# Patient Record
Sex: MALE | Race: OTHER | NOT HISPANIC OR LATINO | URBAN - METROPOLITAN AREA
[De-identification: names, ages, dates, MRNs, and addresses within clinical notes are randomized per-mention and may not be internally consistent; named-entity substitution may affect disease eponyms.]

---

## 2017-08-05 ENCOUNTER — INPATIENT (INPATIENT)
Facility: HOSPITAL | Age: 72
LOS: 3 days | Discharge: HOME | DRG: 683 | End: 2017-08-09
Attending: FAMILY MEDICINE | Admitting: EMERGENCY MEDICINE
Payer: MEDICAID

## 2017-08-05 VITALS
TEMPERATURE: 98 F | HEART RATE: 74 BPM | DIASTOLIC BLOOD PRESSURE: 67 MMHG | HEIGHT: 66 IN | SYSTOLIC BLOOD PRESSURE: 157 MMHG | RESPIRATION RATE: 16 BRPM | WEIGHT: 190.04 LBS | OXYGEN SATURATION: 99 %

## 2017-08-05 DIAGNOSIS — N17.9 ACUTE KIDNEY FAILURE, UNSPECIFIED: ICD-10-CM

## 2017-08-05 LAB
ALBUMIN SERPL ELPH-MCNC: 4.4 G/DL — SIGNIFICANT CHANGE UP (ref 3.3–5.2)
ALP SERPL-CCNC: 112 U/L — SIGNIFICANT CHANGE UP (ref 40–120)
ALT FLD-CCNC: 14 U/L — SIGNIFICANT CHANGE UP
ANION GAP SERPL CALC-SCNC: 16 MMOL/L — SIGNIFICANT CHANGE UP (ref 5–17)
APTT BLD: 29.8 SEC — SIGNIFICANT CHANGE UP (ref 27.5–37.4)
AST SERPL-CCNC: 15 U/L — SIGNIFICANT CHANGE UP
BASOPHILS # BLD AUTO: 0 K/UL — SIGNIFICANT CHANGE UP (ref 0–0.2)
BASOPHILS NFR BLD AUTO: 0.3 % — SIGNIFICANT CHANGE UP (ref 0–2)
BILIRUB SERPL-MCNC: 0.3 MG/DL — LOW (ref 0.4–2)
BUN SERPL-MCNC: 72 MG/DL — HIGH (ref 8–20)
CALCIUM SERPL-MCNC: 9.1 MG/DL — SIGNIFICANT CHANGE UP (ref 8.6–10.2)
CHLORIDE SERPL-SCNC: 104 MMOL/L — SIGNIFICANT CHANGE UP (ref 98–107)
CO2 SERPL-SCNC: 19 MMOL/L — LOW (ref 22–29)
CREAT SERPL-MCNC: 3.75 MG/DL — HIGH (ref 0.5–1.3)
EOSINOPHIL # BLD AUTO: 0.2 K/UL — SIGNIFICANT CHANGE UP (ref 0–0.5)
EOSINOPHIL NFR BLD AUTO: 2.8 % — SIGNIFICANT CHANGE UP (ref 0–5)
GLUCOSE SERPL-MCNC: 326 MG/DL — HIGH (ref 70–115)
HCT VFR BLD CALC: 25.8 % — LOW (ref 42–52)
HGB BLD-MCNC: 8.3 G/DL — LOW (ref 14–18)
INR BLD: 0.97 RATIO — SIGNIFICANT CHANGE UP (ref 0.88–1.16)
LYMPHOCYTES # BLD AUTO: 1.4 K/UL — SIGNIFICANT CHANGE UP (ref 1–4.8)
LYMPHOCYTES # BLD AUTO: 22.1 % — SIGNIFICANT CHANGE UP (ref 20–55)
MCHC RBC-ENTMCNC: 25.8 PG — LOW (ref 27–31)
MCHC RBC-ENTMCNC: 32.2 G/DL — SIGNIFICANT CHANGE UP (ref 32–36)
MCV RBC AUTO: 80.1 FL — SIGNIFICANT CHANGE UP (ref 80–94)
MONOCYTES # BLD AUTO: 0.5 K/UL — SIGNIFICANT CHANGE UP (ref 0–0.8)
MONOCYTES NFR BLD AUTO: 8 % — SIGNIFICANT CHANGE UP (ref 3–10)
NEUTROPHILS # BLD AUTO: 4.3 K/UL — SIGNIFICANT CHANGE UP (ref 1.8–8)
NEUTROPHILS NFR BLD AUTO: 66.6 % — SIGNIFICANT CHANGE UP (ref 37–73)
NT-PROBNP SERPL-SCNC: 913 PG/ML — HIGH (ref 0–300)
PLATELET # BLD AUTO: 184 K/UL — SIGNIFICANT CHANGE UP (ref 150–400)
POTASSIUM SERPL-MCNC: 5.5 MMOL/L — HIGH (ref 3.5–5.3)
POTASSIUM SERPL-SCNC: 5.5 MMOL/L — HIGH (ref 3.5–5.3)
PROT SERPL-MCNC: 7.6 G/DL — SIGNIFICANT CHANGE UP (ref 6.6–8.7)
PROTHROM AB SERPL-ACNC: 10.7 SEC — SIGNIFICANT CHANGE UP (ref 9.8–12.7)
RBC # BLD: 3.22 M/UL — LOW (ref 4.6–6.2)
RBC # FLD: 13.9 % — SIGNIFICANT CHANGE UP (ref 11–15.6)
SODIUM SERPL-SCNC: 139 MMOL/L — SIGNIFICANT CHANGE UP (ref 135–145)
TROPONIN T SERPL-MCNC: 0.02 NG/ML — SIGNIFICANT CHANGE UP (ref 0–0.06)
WBC # BLD: 6.5 K/UL — SIGNIFICANT CHANGE UP (ref 4.8–10.8)
WBC # FLD AUTO: 6.5 K/UL — SIGNIFICANT CHANGE UP (ref 4.8–10.8)

## 2017-08-05 PROCEDURE — 76775 US EXAM ABDO BACK WALL LIM: CPT | Mod: 26

## 2017-08-05 PROCEDURE — 71010: CPT | Mod: 26

## 2017-08-05 PROCEDURE — 99285 EMERGENCY DEPT VISIT HI MDM: CPT

## 2017-08-05 PROCEDURE — 93010 ELECTROCARDIOGRAM REPORT: CPT

## 2017-08-05 RX ORDER — SODIUM POLYSTYRENE SULFONATE 4.1 MEQ/G
30 POWDER, FOR SUSPENSION ORAL ONCE
Qty: 0 | Refills: 0 | Status: COMPLETED | OUTPATIENT
Start: 2017-08-05 | End: 2017-08-05

## 2017-08-05 RX ORDER — SODIUM CHLORIDE 9 MG/ML
1000 INJECTION, SOLUTION INTRAVENOUS
Qty: 0 | Refills: 0 | Status: DISCONTINUED | OUTPATIENT
Start: 2017-08-05 | End: 2017-08-08

## 2017-08-05 RX ADMIN — SODIUM CHLORIDE 75 MILLILITER(S): 9 INJECTION, SOLUTION INTRAVENOUS at 22:27

## 2017-08-05 RX ADMIN — SODIUM POLYSTYRENE SULFONATE 30 GRAM(S): 4.1 POWDER, FOR SUSPENSION ORAL at 22:17

## 2017-08-05 NOTE — ED PROVIDER NOTE - OBJECTIVE STATEMENT
72 year old male with PMH DM, htn, hld ckd presenting with itching. Pt states that for several months he has had episodes of tingling and itching. Sx are intermittent, no alleviating/exacerbating factors, and occur diffusely. Pt deneis fever, chills, abd pain, chest pain, SOB, but does endorse pedal edema.

## 2017-08-05 NOTE — ED PROVIDER NOTE - PRINCIPAL DIAGNOSIS
Acute renal failure superimposed on chronic kidney disease, unspecified CKD stage, unspecified acute renal failure type

## 2017-08-05 NOTE — ED PROVIDER NOTE - CARE PLAN
Principal Discharge DX:	Acute renal failure superimposed on chronic kidney disease, unspecified CKD stage, unspecified acute renal failure type

## 2017-08-05 NOTE — ED PROVIDER NOTE - PROGRESS NOTE DETAILS
discussed with neuro given the hyperkalemia and acidosis. He advises kelin, tele, IVF with bicarb bhavana will come to see in am

## 2017-08-05 NOTE — ED ADULT TRIAGE NOTE - CHIEF COMPLAINT QUOTE
pt presents to ED with swelling to b/l  LE/feet x four days. afebrile. as per pt, no redness noted to feet,. denies SOB. breathing si shakira and unlabored.

## 2017-08-05 NOTE — ED STATDOCS - PROGRESS NOTE DETAILS
73 y/o male with PMHx diabetes presents to the ED with c/o lower leg pain, onset 4 days. Pt reports swelling to LE and SOB since yesterday. Protocol orders have been entered following a focused evaluation in intake. Pt to be placed in the main ED for further evaluation by another provider.

## 2017-08-05 NOTE — ED ADULT NURSE NOTE - PMH
Anemia  unspecified iron deficiency  BPH (benign prostatic hyperplasia)    Bradycardia    CKD (chronic kidney disease)    Diabetes  DM type 2  Gout    HTN (hypertension)    Hyperlipemia    Nonspecific ST-T changes

## 2017-08-05 NOTE — ED ADULT NURSE REASSESSMENT NOTE - NS ED NURSE REASSESS COMMENT FT1
Report received from off going RN, charting as noted. Patient A&Ox4, denies any pain or discomfort. Respirations even & unlabored, denies any numbness or tingling. Cardiac monitor in place. IV site C/D/I, patent, negative s/s phlebitis or infiltration. IVF in progress, well tolerated. Daughter at bedside, will continue to monitor.

## 2017-08-06 DIAGNOSIS — E87.5 HYPERKALEMIA: ICD-10-CM

## 2017-08-06 DIAGNOSIS — N18.5 CHRONIC KIDNEY DISEASE, STAGE 5: ICD-10-CM

## 2017-08-06 DIAGNOSIS — E11.22 TYPE 2 DIABETES MELLITUS WITH DIABETIC CHRONIC KIDNEY DISEASE: ICD-10-CM

## 2017-08-06 DIAGNOSIS — H40.9 UNSPECIFIED GLAUCOMA: ICD-10-CM

## 2017-08-06 DIAGNOSIS — N17.9 ACUTE KIDNEY FAILURE, UNSPECIFIED: ICD-10-CM

## 2017-08-06 DIAGNOSIS — N40.0 BENIGN PROSTATIC HYPERPLASIA WITHOUT LOWER URINARY TRACT SYMPTOMS: ICD-10-CM

## 2017-08-06 DIAGNOSIS — E78.5 HYPERLIPIDEMIA, UNSPECIFIED: ICD-10-CM

## 2017-08-06 LAB
ANION GAP SERPL CALC-SCNC: 16 MMOL/L — SIGNIFICANT CHANGE UP (ref 5–17)
APPEARANCE UR: CLEAR — SIGNIFICANT CHANGE UP
BACTERIA # UR AUTO: ABNORMAL
BASOPHILS # BLD AUTO: 0 K/UL — SIGNIFICANT CHANGE UP (ref 0–0.2)
BASOPHILS NFR BLD AUTO: 0.3 % — SIGNIFICANT CHANGE UP (ref 0–2)
BILIRUB UR-MCNC: NEGATIVE — SIGNIFICANT CHANGE UP
BUN SERPL-MCNC: 70 MG/DL — HIGH (ref 8–20)
CALCIUM SERPL-MCNC: 8.7 MG/DL — SIGNIFICANT CHANGE UP (ref 8.6–10.2)
CHLORIDE SERPL-SCNC: 108 MMOL/L — HIGH (ref 98–107)
CO2 SERPL-SCNC: 21 MMOL/L — LOW (ref 22–29)
COLOR SPEC: YELLOW — SIGNIFICANT CHANGE UP
CREAT SERPL-MCNC: 3.6 MG/DL — HIGH (ref 0.5–1.3)
DIFF PNL FLD: ABNORMAL
EOSINOPHIL # BLD AUTO: 0.2 K/UL — SIGNIFICANT CHANGE UP (ref 0–0.5)
EOSINOPHIL NFR BLD AUTO: 3.1 % — SIGNIFICANT CHANGE UP (ref 0–5)
EPI CELLS # UR: SIGNIFICANT CHANGE UP
FERRITIN SERPL-MCNC: 106.2 NG/ML — SIGNIFICANT CHANGE UP (ref 30–400)
GLUCOSE SERPL-MCNC: 187 MG/DL — HIGH (ref 70–115)
GLUCOSE UR QL: NEGATIVE MG/DL — SIGNIFICANT CHANGE UP
HBA1C BLD-MCNC: 7.3 % — HIGH (ref 4–5.6)
HCT VFR BLD CALC: 25 % — LOW (ref 42–52)
HGB BLD-MCNC: 8.1 G/DL — LOW (ref 14–18)
IRON SATN MFR SERPL: 18 % — SIGNIFICANT CHANGE UP (ref 16–55)
IRON SATN MFR SERPL: 49 UG/DL — LOW (ref 59–158)
KETONES UR-MCNC: NEGATIVE — SIGNIFICANT CHANGE UP
LEUKOCYTE ESTERASE UR-ACNC: NEGATIVE — SIGNIFICANT CHANGE UP
LYMPHOCYTES # BLD AUTO: 1.8 K/UL — SIGNIFICANT CHANGE UP (ref 1–4.8)
LYMPHOCYTES # BLD AUTO: 24.4 % — SIGNIFICANT CHANGE UP (ref 20–55)
MAGNESIUM SERPL-MCNC: 2.1 MG/DL — SIGNIFICANT CHANGE UP (ref 1.6–2.6)
MCHC RBC-ENTMCNC: 26 PG — LOW (ref 27–31)
MCHC RBC-ENTMCNC: 32.4 G/DL — SIGNIFICANT CHANGE UP (ref 32–36)
MCV RBC AUTO: 80.1 FL — SIGNIFICANT CHANGE UP (ref 80–94)
MONOCYTES # BLD AUTO: 0.5 K/UL — SIGNIFICANT CHANGE UP (ref 0–0.8)
MONOCYTES NFR BLD AUTO: 6.8 % — SIGNIFICANT CHANGE UP (ref 3–10)
NEUTROPHILS # BLD AUTO: 4.7 K/UL — SIGNIFICANT CHANGE UP (ref 1.8–8)
NEUTROPHILS NFR BLD AUTO: 65.1 % — SIGNIFICANT CHANGE UP (ref 37–73)
NITRITE UR-MCNC: NEGATIVE — SIGNIFICANT CHANGE UP
PH UR: 6.5 — SIGNIFICANT CHANGE UP (ref 5–8)
PHOSPHATE SERPL-MCNC: 4.2 MG/DL — SIGNIFICANT CHANGE UP (ref 2.4–4.7)
PLATELET # BLD AUTO: 173 K/UL — SIGNIFICANT CHANGE UP (ref 150–400)
POTASSIUM SERPL-MCNC: 4.8 MMOL/L — SIGNIFICANT CHANGE UP (ref 3.5–5.3)
POTASSIUM SERPL-SCNC: 4.8 MMOL/L — SIGNIFICANT CHANGE UP (ref 3.5–5.3)
PROT UR-MCNC: 100 MG/DL
RBC # BLD: 3.12 M/UL — LOW (ref 4.6–6.2)
RBC # BLD: 3.12 M/UL — LOW (ref 4.6–6.2)
RBC # FLD: 14.1 % — SIGNIFICANT CHANGE UP (ref 11–15.6)
RBC CASTS # UR COMP ASSIST: SIGNIFICANT CHANGE UP /HPF (ref 0–4)
RETICS #: 3.3 K/UL — LOW (ref 25–125)
RETICS/RBC NFR: 1 % — SIGNIFICANT CHANGE UP (ref 0.5–2.6)
SODIUM SERPL-SCNC: 145 MMOL/L — SIGNIFICANT CHANGE UP (ref 135–145)
SP GR SPEC: 1.01 — SIGNIFICANT CHANGE UP (ref 1.01–1.02)
TIBC SERPL-MCNC: 279 UG/DL — SIGNIFICANT CHANGE UP (ref 220–430)
TRANSFERRIN SERPL-MCNC: 195 MG/DL — SIGNIFICANT CHANGE UP (ref 180–329)
UROBILINOGEN FLD QL: NEGATIVE MG/DL — SIGNIFICANT CHANGE UP
WBC # BLD: 7.2 K/UL — SIGNIFICANT CHANGE UP (ref 4.8–10.8)
WBC # FLD AUTO: 7.2 K/UL — SIGNIFICANT CHANGE UP (ref 4.8–10.8)
WBC UR QL: SIGNIFICANT CHANGE UP

## 2017-08-06 PROCEDURE — 93970 EXTREMITY STUDY: CPT | Mod: 26

## 2017-08-06 PROCEDURE — 12345: CPT | Mod: GC,NC

## 2017-08-06 PROCEDURE — 93880 EXTRACRANIAL BILAT STUDY: CPT | Mod: 26

## 2017-08-06 RX ORDER — SODIUM CHLORIDE 9 MG/ML
1000 INJECTION, SOLUTION INTRAVENOUS
Qty: 0 | Refills: 0 | Status: DISCONTINUED | OUTPATIENT
Start: 2017-08-06 | End: 2017-08-09

## 2017-08-06 RX ORDER — TIMOLOL 0.5 %
1 DROPS OPHTHALMIC (EYE)
Qty: 0 | Refills: 0 | COMMUNITY

## 2017-08-06 RX ORDER — LISINOPRIL 2.5 MG/1
5 TABLET ORAL DAILY
Qty: 0 | Refills: 0 | Status: DISCONTINUED | OUTPATIENT
Start: 2017-08-06 | End: 2017-08-09

## 2017-08-06 RX ORDER — DEXTROSE 50 % IN WATER 50 %
12.5 SYRINGE (ML) INTRAVENOUS ONCE
Qty: 0 | Refills: 0 | Status: DISCONTINUED | OUTPATIENT
Start: 2017-08-06 | End: 2017-08-09

## 2017-08-06 RX ORDER — IRON SUCROSE 20 MG/ML
200 INJECTION, SOLUTION INTRAVENOUS DAILY
Qty: 0 | Refills: 0 | Status: DISCONTINUED | OUTPATIENT
Start: 2017-08-06 | End: 2017-08-09

## 2017-08-06 RX ORDER — TAMSULOSIN HYDROCHLORIDE 0.4 MG/1
0.4 CAPSULE ORAL AT BEDTIME
Qty: 0 | Refills: 0 | Status: DISCONTINUED | OUTPATIENT
Start: 2017-08-06 | End: 2017-08-09

## 2017-08-06 RX ORDER — GLUCAGON INJECTION, SOLUTION 0.5 MG/.1ML
1 INJECTION, SOLUTION SUBCUTANEOUS ONCE
Qty: 0 | Refills: 0 | Status: DISCONTINUED | OUTPATIENT
Start: 2017-08-06 | End: 2017-08-09

## 2017-08-06 RX ORDER — ONDANSETRON 8 MG/1
4 TABLET, FILM COATED ORAL EVERY 6 HOURS
Qty: 0 | Refills: 0 | Status: DISCONTINUED | OUTPATIENT
Start: 2017-08-06 | End: 2017-08-09

## 2017-08-06 RX ORDER — TIMOLOL 0.5 %
1 DROPS OPHTHALMIC (EYE)
Qty: 0 | Refills: 0 | Status: DISCONTINUED | OUTPATIENT
Start: 2017-08-06 | End: 2017-08-09

## 2017-08-06 RX ORDER — ATORVASTATIN CALCIUM 80 MG/1
20 TABLET, FILM COATED ORAL AT BEDTIME
Qty: 0 | Refills: 0 | Status: DISCONTINUED | OUTPATIENT
Start: 2017-08-06 | End: 2017-08-08

## 2017-08-06 RX ORDER — DEXTROSE 50 % IN WATER 50 %
1 SYRINGE (ML) INTRAVENOUS ONCE
Qty: 0 | Refills: 0 | Status: DISCONTINUED | OUTPATIENT
Start: 2017-08-06 | End: 2017-08-09

## 2017-08-06 RX ORDER — LABETALOL HCL 100 MG
100 TABLET ORAL
Qty: 0 | Refills: 0 | Status: DISCONTINUED | OUTPATIENT
Start: 2017-08-06 | End: 2017-08-09

## 2017-08-06 RX ORDER — HEPARIN SODIUM 5000 [USP'U]/ML
5000 INJECTION INTRAVENOUS; SUBCUTANEOUS EVERY 8 HOURS
Qty: 0 | Refills: 0 | Status: DISCONTINUED | OUTPATIENT
Start: 2017-08-06 | End: 2017-08-09

## 2017-08-06 RX ORDER — ALLOPURINOL 300 MG
100 TABLET ORAL DAILY
Qty: 0 | Refills: 0 | Status: DISCONTINUED | OUTPATIENT
Start: 2017-08-06 | End: 2017-08-09

## 2017-08-06 RX ORDER — SODIUM CHLORIDE 9 MG/ML
3 INJECTION INTRAMUSCULAR; INTRAVENOUS; SUBCUTANEOUS EVERY 8 HOURS
Qty: 0 | Refills: 0 | Status: DISCONTINUED | OUTPATIENT
Start: 2017-08-06 | End: 2017-08-09

## 2017-08-06 RX ORDER — ERYTHROPOIETIN 10000 [IU]/ML
10000 INJECTION, SOLUTION INTRAVENOUS; SUBCUTANEOUS
Qty: 0 | Refills: 0 | Status: DISCONTINUED | OUTPATIENT
Start: 2017-08-06 | End: 2017-08-09

## 2017-08-06 RX ORDER — FUROSEMIDE 40 MG
40 TABLET ORAL DAILY
Qty: 0 | Refills: 0 | Status: DISCONTINUED | OUTPATIENT
Start: 2017-08-06 | End: 2017-08-07

## 2017-08-06 RX ORDER — INSULIN LISPRO 100/ML
VIAL (ML) SUBCUTANEOUS
Qty: 0 | Refills: 0 | Status: DISCONTINUED | OUTPATIENT
Start: 2017-08-06 | End: 2017-08-09

## 2017-08-06 RX ORDER — AMLODIPINE BESYLATE 2.5 MG/1
10 TABLET ORAL DAILY
Qty: 0 | Refills: 0 | Status: DISCONTINUED | OUTPATIENT
Start: 2017-08-06 | End: 2017-08-09

## 2017-08-06 RX ORDER — SODIUM BICARBONATE 1 MEQ/ML
650 SYRINGE (ML) INTRAVENOUS
Qty: 0 | Refills: 0 | Status: DISCONTINUED | OUTPATIENT
Start: 2017-08-06 | End: 2017-08-09

## 2017-08-06 RX ORDER — INSULIN DETEMIR 100/ML (3)
20 INSULIN PEN (ML) SUBCUTANEOUS AT BEDTIME
Qty: 0 | Refills: 0 | Status: DISCONTINUED | OUTPATIENT
Start: 2017-08-06 | End: 2017-08-07

## 2017-08-06 RX ORDER — INSULIN DETEMIR 100/ML (3)
20 INSULIN PEN (ML) SUBCUTANEOUS ONCE
Qty: 0 | Refills: 0 | Status: COMPLETED | OUTPATIENT
Start: 2017-08-06 | End: 2017-08-06

## 2017-08-06 RX ADMIN — Medication 4: at 11:54

## 2017-08-06 RX ADMIN — Medication 20 UNIT(S): at 01:47

## 2017-08-06 RX ADMIN — TAMSULOSIN HYDROCHLORIDE 0.4 MILLIGRAM(S): 0.4 CAPSULE ORAL at 21:43

## 2017-08-06 RX ADMIN — Medication 1 TABLET(S): at 11:55

## 2017-08-06 RX ADMIN — Medication 1 DROP(S): at 06:48

## 2017-08-06 RX ADMIN — HEPARIN SODIUM 5000 UNIT(S): 5000 INJECTION INTRAVENOUS; SUBCUTANEOUS at 21:43

## 2017-08-06 RX ADMIN — ATORVASTATIN CALCIUM 20 MILLIGRAM(S): 80 TABLET, FILM COATED ORAL at 21:43

## 2017-08-06 RX ADMIN — IRON SUCROSE 110 MILLIGRAM(S): 20 INJECTION, SOLUTION INTRAVENOUS at 11:54

## 2017-08-06 RX ADMIN — HEPARIN SODIUM 5000 UNIT(S): 5000 INJECTION INTRAVENOUS; SUBCUTANEOUS at 11:55

## 2017-08-06 RX ADMIN — AMLODIPINE BESYLATE 10 MILLIGRAM(S): 2.5 TABLET ORAL at 06:48

## 2017-08-06 RX ADMIN — Medication 40 MILLIGRAM(S): at 11:59

## 2017-08-06 RX ADMIN — SODIUM CHLORIDE 3 MILLILITER(S): 9 INJECTION INTRAMUSCULAR; INTRAVENOUS; SUBCUTANEOUS at 06:07

## 2017-08-06 RX ADMIN — Medication 650 MILLIGRAM(S): at 17:35

## 2017-08-06 RX ADMIN — HEPARIN SODIUM 5000 UNIT(S): 5000 INJECTION INTRAVENOUS; SUBCUTANEOUS at 06:48

## 2017-08-06 RX ADMIN — Medication 100 MILLIGRAM(S): at 17:35

## 2017-08-06 RX ADMIN — Medication 4: at 17:59

## 2017-08-06 RX ADMIN — SODIUM CHLORIDE 3 MILLILITER(S): 9 INJECTION INTRAMUSCULAR; INTRAVENOUS; SUBCUTANEOUS at 11:55

## 2017-08-06 RX ADMIN — SODIUM CHLORIDE 3 MILLILITER(S): 9 INJECTION INTRAMUSCULAR; INTRAVENOUS; SUBCUTANEOUS at 21:24

## 2017-08-06 RX ADMIN — Medication 1 DROP(S): at 17:34

## 2017-08-06 RX ADMIN — SODIUM CHLORIDE 75 MILLILITER(S): 9 INJECTION, SOLUTION INTRAVENOUS at 11:54

## 2017-08-06 RX ADMIN — Medication 20 UNIT(S): at 21:43

## 2017-08-06 RX ADMIN — Medication 100 MILLIGRAM(S): at 11:55

## 2017-08-06 RX ADMIN — LISINOPRIL 5 MILLIGRAM(S): 2.5 TABLET ORAL at 11:55

## 2017-08-06 NOTE — H&P ADULT - PROBLEM SELECTOR PLAN 4
Likely due to advanced renal disease as indices favor AOCD, check iron stores before starting Procrit. stool occult blood.

## 2017-08-06 NOTE — CHART NOTE - NSCHARTNOTEFT_GEN_A_CORE
Post midnight admission brief note. Patient was seen and examined by overnight hospitalist this am. HPI reviewed. Labs, vitals noted. I have personally seen and examined this patient today.     VSS   Exam with pitting edema to 3+ b/l LE   + b/l carotid bruits left greater than right     Labs reviewed       A/P Patient is a 71 y/o male with advanced CKD with possible acute on chronic component, hyperkalemia, HTN, HLD, Gout, Glaucoma, Anemia presenting with LE edema.     Problem List:   1) Hyperkalemia   2) ARF   3) DM II  4) Anemia   5) BPH    -Seen by renal, non obstructive disease   -Diurese  -Start ace   -monitor i/o     Plan d/w patient with  (resident MD). Will follow

## 2017-08-06 NOTE — H&P ADULT - HISTORY OF PRESENT ILLNESS
Patient is a 71 y/o male with a history of HTN, HLD, Glaucoma, Gout, Chronic anemia, Chronic kidney disease related to HTN/DM with unknown baseline GFR. Patient recently immigrated from [place] and as per Daughter has been getting poor care there. They have been seen in the [facility] clinic twice since [place] to the [place] as they have no insurance and have been having trouble affording their medications, but now have the medications that where prescribed in the clinic. Patient was to see Nephrology in 10/17 but his Daughter brought him in because he has been c/o itching, B/L LE edema. No calf pain or SOB. No CP, Fever. He has occasional chills. He also admits to urinating frequently up to 5 times a night with large volume. No Hematuria. In the ED patient with mild LE edema, no calf pain, no signs of CHF, but was found to have elevated BUN/Cr. with hyperkalemia with normal AG. Renal U/S with no evidence of obstruction. Case discussed with Nephrology and based on low GFR and hyperkalemia they advised admission for further management as opposed to having him wait till 10/17 for o/p f/u.

## 2017-08-06 NOTE — H&P ADULT - ASSESSMENT
71 y/o male with advanced CKD with possible acute on chronic component, hyperkalemia, HTN, HLD, Gout, Glaucoma, Anemia

## 2017-08-06 NOTE — CONSULT NOTE ADULT - SUBJECTIVE AND OBJECTIVE BOX
HPI:  73 y/o Eduardo POSEY recently came from [place] has h/o DM 15-20 yrs admits to not having a good diet, also h/o  HTN, HLD, Glaucoma, Gout, Chronic anemia, CKD related to HTN/DM with unknown baseline GFR. Patient recently immigrated from [place] and as per Daughter has been getting poor care there. They have been seen in the [facility] clinic twice [place] to the [place] as they have no insurance and have been having trouble affording their medications, but now have the medications that where prescribed in the clinic. Patient was to see Nephrology in 10/17 but his Daughter brought him in because he has been c/o itching, B/L LE edema. No calf pain or SOB. No CP, Fever. He has occasional chills. He also admits to urinating frequently up to 5 times a night with large volume. No Hematuria. In the ED patient with mild LE edema, no calf pain, no signs of CHF, but was found to have elevated BUN/Cr. with hyperkalemia with normal AG. Renal U/S with no evidence of obstruction. Cr found to be 3.6 baseline unknown.    ROS per HPI      PAST MEDICAL & SURGICAL HISTORY:  Nonspecific ST-T changes  Bradycardia  Gout  Anemia: unspecified iron deficiency  BPH (benign prostatic hyperplasia)  Hyperlipemia  CKD (chronic kidney disease)  Diabetes: DM type 2  HTN (hypertension)  No significant past surgical history   DM 2, MO, hypothyroidism, prior DVT and IVC filter; Cholecystectomy    FAMILY HISTORY:  No pertinent family history in first degree relatives  [place]    Social History:Non smoker    MEDICATIONS  (STANDING):  sodium chloride 0.45% 1000 milliLiter(s) (75 mL/Hr) IV Continuous <Continuous>  heparin  Injectable 5000 Unit(s) SubCutaneous every 8 hours  sodium chloride 0.9% lock flush 3 milliLiter(s) IV Push every 8 hours  insulin lispro (HumaLOG) corrective regimen sliding scale   SubCutaneous Before meals and at bedtime  dextrose 5%. 1000 milliLiter(s) (50 mL/Hr) IV Continuous <Continuous>  dextrose 50% Injectable 12.5 Gram(s) IV Push once  amLODIPine   Tablet 10 milliGRAM(s) Oral daily  insulin detemir injectable (LEVEMIR) 20 Unit(s) SubCutaneous at bedtime  tamsulosin 0.4 milliGRAM(s) Oral at bedtime  atorvastatin 20 milliGRAM(s) Oral at bedtime  timolol 0.5% Solution 1 Drop(s) Both EYES two times a day  allopurinol 100 milliGRAM(s) Oral daily  Nephro-silvia 1 Tablet(s) Oral daily    MEDICATIONS  (PRN):  ondansetron Injectable 4 milliGRAM(s) IV Push every 6 hours PRN Nausea  dextrose Gel 1 Dose(s) Oral once PRN Blood Glucose LESS THAN 70 milliGRAM(s)/deciliter  glucagon  Injectable 1 milliGRAM(s) IntraMuscular once PRN Glucose LESS THAN 70 milligrams/deciliter   Meds reviewed    Allergies    pyrilamine (Hives)        Vital Signs Last 24 Hrs  T(C): 37 (06 Aug 2017 07:27), Max: 37 (06 Aug 2017 07:27)  T(F): 98.6 (06 Aug 2017 07:27), Max: 98.6 (06 Aug 2017 07:27)  HR: 91 (06 Aug 2017 07:27) (71 - 95)  BP: 175/79 (06 Aug 2017 07:27) (154/78 - 175/79)  BP(mean): 103 (06 Aug 2017 00:52) (103 - 103)  RR: 22 (06 Aug 2017 07:27) (16 - 22)  SpO2: 95% (06 Aug 2017 07:27) (95% - 100%)  Daily Height in cm: 167.64 (05 Aug 2017 15:31)    Daily     PHYSICAL EXAM:    GENERAL: appears chronically ill, oriented.  HEAD:  Atraumatic, Normocephalic  EYES: EOMI  NECK: Supple  NERVOUS SYSTEM:  Alert & Oriented X3  CHEST/LUNG: Clear to percussion bilaterally; No rubs  HEART: Regular rate and rhythm; No murmurs  ABDOMEN: Soft, Nontender, Nondistended; Bowel sounds present  EXTREMITIES:  2+ pitting edema      LABS:                        8.1    7.2   )-----------( 173      ( 06 Aug 2017 05:34 )             25.0     08-06    145  |  108<H>  |  70.0<H>  ----------------------------<  187<H>  4.8   |  21.0<L>  |  3.60<H>    Ca    8.7      06 Aug 2017 05:34  Phos  4.2     08-06  Mg     2.1     08-06    TPro  7.6  /  Alb  4.4  /  TBili  0.3<L>  /  DBili  x   /  AST  15  /  ALT  14  /  AlkPhos  112  08-05    PT/INR - ( 05 Aug 2017 19:56 )   PT: 10.7 sec;   INR: 0.97 ratio         PTT - ( 05 Aug 2017 19:56 )  PTT:29.8 sec    Magnesium, Serum: 2.1 mg/dL (08-06 @ 05:34)  Phosphorus Level, Serum: 4.2 mg/dL (08-06 @ 05:34)          RADIOLOGY & ADDITIONAL TESTS:

## 2017-08-06 NOTE — CONSULT NOTE ADULT - ASSESSMENT
ERYN on CKD vs progression of CKD   No hydro on renal sono  Suspect etiology is diabetic nephropathy will check UA and 24 hr urine study for volume and protein  Anemia likely 2/2 CKD TS 18% will give 5 days venofer and EPO Q wk  RO: will check vit D and PTH  Needs to be on a renal diet needs dietician education renal and diabetic diet ERYN on CKD vs progression of CKD   No hydro on renal sono  Suspect etiology is diabetic nephropathy will check UA and 24 hr urine study for volume and protein  -will start ACEi- lisinopril 5 mg po q day  Anemia likely 2/2 CKD TS 18% will give 5 days venofer and EPO Q wk  RO: will check vit D and PTH  Edema pitting will start diuretics clinically volume overloaded  Needs to be on a renal diet needs dietician education renal and diabetic diet ERYN on CKD vs progression of CKD suspect progression  No hydro on renal sono  Suspect etiology is diabetic nephropathy will check UA and 24 hr urine study for volume and protein  -will start ACEi- lisinopril 5 mg po q day  Anemia likely 2/2 CKD TS 18% will give 5 days venofer and EPO Q wk  RO: will check vit D and PTH will start po bicarb for MA  Edema pitting will start diuretics clinically volume overloaded  Needs to be on a renal diet needs dietician education renal and diabetic diet

## 2017-08-06 NOTE — H&P ADULT - PROBLEM SELECTOR PLAN 3
Increase levemir, check A1c, lipids, cont. HISS, FS AC/HS. Diabetic education, Nutrition support as patient diet mainly consists of white rice, bread, rolls, bagels. Will need o/p eye exam, podiatry care.

## 2017-08-06 NOTE — H&P ADULT - PROBLEM SELECTOR PLAN 2
Trend labs to see where patient plateaus. Likely will need Vascular and vein mapping for anticipated HD in near future. SW/CM as patient has no SS number and will need o/p serviced ie medications, follow up, possible HD in near future. Avoid nephrotoxic drugs, renally dose needed medications. Check U/A, Nephrovite, renal diet.

## 2017-08-07 LAB
ANION GAP SERPL CALC-SCNC: 15 MMOL/L — SIGNIFICANT CHANGE UP (ref 5–17)
BUN SERPL-MCNC: 68 MG/DL — HIGH (ref 8–20)
CALCIUM SERPL-MCNC: 8.5 MG/DL — LOW (ref 8.6–10.2)
CHLORIDE SERPL-SCNC: 106 MMOL/L — SIGNIFICANT CHANGE UP (ref 98–107)
CO2 SERPL-SCNC: 25 MMOL/L — SIGNIFICANT CHANGE UP (ref 22–29)
CREAT SERPL-MCNC: 3.55 MG/DL — HIGH (ref 0.5–1.3)
GLUCOSE SERPL-MCNC: 49 MG/DL — CRITICAL LOW (ref 70–115)
POTASSIUM SERPL-MCNC: 3.8 MMOL/L — SIGNIFICANT CHANGE UP (ref 3.5–5.3)
POTASSIUM SERPL-SCNC: 3.8 MMOL/L — SIGNIFICANT CHANGE UP (ref 3.5–5.3)
SODIUM SERPL-SCNC: 146 MMOL/L — HIGH (ref 135–145)

## 2017-08-07 PROCEDURE — 99233 SBSQ HOSP IP/OBS HIGH 50: CPT

## 2017-08-07 RX ORDER — INSULIN DETEMIR 100/ML (3)
15 INSULIN PEN (ML) SUBCUTANEOUS AT BEDTIME
Qty: 0 | Refills: 0 | Status: DISCONTINUED | OUTPATIENT
Start: 2017-08-07 | End: 2017-08-09

## 2017-08-07 RX ORDER — ATORVASTATIN CALCIUM 80 MG/1
20 TABLET, FILM COATED ORAL AT BEDTIME
Qty: 0 | Refills: 0 | Status: DISCONTINUED | OUTPATIENT
Start: 2017-08-07 | End: 2017-08-09

## 2017-08-07 RX ORDER — FERROUS SULFATE 325(65) MG
325 TABLET ORAL DAILY
Qty: 0 | Refills: 0 | Status: DISCONTINUED | OUTPATIENT
Start: 2017-08-07 | End: 2017-08-08

## 2017-08-07 RX ADMIN — TAMSULOSIN HYDROCHLORIDE 0.4 MILLIGRAM(S): 0.4 CAPSULE ORAL at 21:44

## 2017-08-07 RX ADMIN — SODIUM CHLORIDE 75 MILLILITER(S): 9 INJECTION, SOLUTION INTRAVENOUS at 17:46

## 2017-08-07 RX ADMIN — SODIUM CHLORIDE 3 MILLILITER(S): 9 INJECTION INTRAMUSCULAR; INTRAVENOUS; SUBCUTANEOUS at 14:16

## 2017-08-07 RX ADMIN — SODIUM CHLORIDE 75 MILLILITER(S): 9 INJECTION, SOLUTION INTRAVENOUS at 21:45

## 2017-08-07 RX ADMIN — SODIUM CHLORIDE 75 MILLILITER(S): 9 INJECTION, SOLUTION INTRAVENOUS at 05:11

## 2017-08-07 RX ADMIN — Medication 100 MILLIGRAM(S): at 12:14

## 2017-08-07 RX ADMIN — Medication 1 DROP(S): at 17:44

## 2017-08-07 RX ADMIN — ATORVASTATIN CALCIUM 20 MILLIGRAM(S): 80 TABLET, FILM COATED ORAL at 21:44

## 2017-08-07 RX ADMIN — Medication 100 MILLIGRAM(S): at 17:45

## 2017-08-07 RX ADMIN — Medication 1 TABLET(S): at 12:14

## 2017-08-07 RX ADMIN — HEPARIN SODIUM 5000 UNIT(S): 5000 INJECTION INTRAVENOUS; SUBCUTANEOUS at 21:44

## 2017-08-07 RX ADMIN — Medication 1 DROP(S): at 05:12

## 2017-08-07 RX ADMIN — SODIUM CHLORIDE 3 MILLILITER(S): 9 INJECTION INTRAMUSCULAR; INTRAVENOUS; SUBCUTANEOUS at 21:14

## 2017-08-07 RX ADMIN — Medication 100 MILLIGRAM(S): at 05:11

## 2017-08-07 RX ADMIN — Medication 40 MILLIGRAM(S): at 05:11

## 2017-08-07 RX ADMIN — Medication 6: at 21:45

## 2017-08-07 RX ADMIN — LISINOPRIL 5 MILLIGRAM(S): 2.5 TABLET ORAL at 05:11

## 2017-08-07 RX ADMIN — Medication 15 UNIT(S): at 21:44

## 2017-08-07 RX ADMIN — HEPARIN SODIUM 5000 UNIT(S): 5000 INJECTION INTRAVENOUS; SUBCUTANEOUS at 05:11

## 2017-08-07 RX ADMIN — AMLODIPINE BESYLATE 10 MILLIGRAM(S): 2.5 TABLET ORAL at 05:11

## 2017-08-07 RX ADMIN — IRON SUCROSE 110 MILLIGRAM(S): 20 INJECTION, SOLUTION INTRAVENOUS at 12:14

## 2017-08-07 RX ADMIN — Medication 325 MILLIGRAM(S): at 12:17

## 2017-08-07 RX ADMIN — Medication 650 MILLIGRAM(S): at 05:12

## 2017-08-07 RX ADMIN — ERYTHROPOIETIN 10000 UNIT(S): 10000 INJECTION, SOLUTION INTRAVENOUS; SUBCUTANEOUS at 12:14

## 2017-08-07 RX ADMIN — Medication 4: at 12:13

## 2017-08-07 RX ADMIN — HEPARIN SODIUM 5000 UNIT(S): 5000 INJECTION INTRAVENOUS; SUBCUTANEOUS at 14:16

## 2017-08-07 RX ADMIN — SODIUM CHLORIDE 3 MILLILITER(S): 9 INJECTION INTRAMUSCULAR; INTRAVENOUS; SUBCUTANEOUS at 05:09

## 2017-08-07 RX ADMIN — Medication 650 MILLIGRAM(S): at 17:45

## 2017-08-07 NOTE — ADVANCED PRACTICE NURSE CONSULT - RECOMMEDATIONS
continue diabetes self management education continue diabetes self management education  referre pt back to [facility] clinic for fu and support w insulin pen

## 2017-08-07 NOTE — PROGRESS NOTE ADULT - ASSESSMENT
73 y/o male with advanced CKD with possible acute on chronic component, hyperkalemia, HTN, HLD, Gout, Glaucoma, Anemia who was admitted due to CKD and hyperkalemia. Pt refer to feel better than yesterday, he has been afebrile and blood sugar is well controlled. Pt was evaluated by nephrology and he recommend ACE as part of his tx.

## 2017-08-07 NOTE — ADVANCED PRACTICE NURSE CONSULT - ASSESSMENT
pt is a+ox3 c/o 0 pain family @ bed side providing comfort pt is a+ox3 c/o 0 pain family @ bed side providing comfort. son states that he is on levemir pen and has no insurance. family states that they would like to continue the regiemen. they are being fu @ [facility] in the city where they receive the pen discounted. pt is a+ox3 c/o 0 pain family @ bed side providing comfort. son states that he is on levemir pen and has no insurance. family states that they would like to continue the regiemen. they are being fu @ [facility] in the [facility] where they receive the pen discounted. family is willing to continue taking pt to the clinic overther.

## 2017-08-07 NOTE — CHART NOTE - NSCHARTNOTEFT_GEN_A_CORE
Today I spoke w/ pt family member and explained the results  of blood work and carotid us, I also explained the plan for further care and management by nephrology doctor. Will continue updating family.

## 2017-08-07 NOTE — PROGRESS NOTE ADULT - ASSESSMENT
ERYN on CKD vs progression of CKD suspect progression  No hydro on renal sono  Suspect etiology is diabetic nephropathy will check UA and 24 hr urine study for volume and protein ongoing  -will start ACEi- lisinopril 5 mg po q day  Anemia likely 2/2 CKD TS 18% cont 5 days venofer and EPO Q wk  RO: will check vit D and PTH will start po bicarb for MA  Needs to be on a renal diet needs dietician education renal and diabetic diet

## 2017-08-08 LAB
ANION GAP SERPL CALC-SCNC: 14 MMOL/L — SIGNIFICANT CHANGE UP (ref 5–17)
BASOPHILS # BLD AUTO: 0 K/UL — SIGNIFICANT CHANGE UP (ref 0–0.2)
BASOPHILS NFR BLD AUTO: 0.3 % — SIGNIFICANT CHANGE UP (ref 0–2)
BUN SERPL-MCNC: 63 MG/DL — HIGH (ref 8–20)
C DIFF BY PCR RESULT: SIGNIFICANT CHANGE UP
C DIFF TOX GENS STL QL NAA+PROBE: SIGNIFICANT CHANGE UP
CALCIUM SERPL-MCNC: 8.6 MG/DL — SIGNIFICANT CHANGE UP (ref 8.6–10.2)
CHLORIDE SERPL-SCNC: 103 MMOL/L — SIGNIFICANT CHANGE UP (ref 98–107)
CHOLEST SERPL-MCNC: 105 MG/DL — LOW (ref 110–199)
CO2 SERPL-SCNC: 28 MMOL/L — SIGNIFICANT CHANGE UP (ref 22–29)
COLLECT DURATION TIME UR: 24 HR — SIGNIFICANT CHANGE UP
CREAT ?TM UR-MCNC: 32 MG/DL — SIGNIFICANT CHANGE UP
CREAT SERPL-MCNC: 3.73 MG/DL — HIGH (ref 0.5–1.3)
EOSINOPHIL # BLD AUTO: 0 K/UL — SIGNIFICANT CHANGE UP (ref 0–0.5)
EOSINOPHIL NFR BLD AUTO: 0.5 % — SIGNIFICANT CHANGE UP (ref 0–5)
GLUCOSE SERPL-MCNC: 63 MG/DL — LOW (ref 70–115)
HCT VFR BLD CALC: 24.7 % — LOW (ref 42–52)
HDLC SERPL-MCNC: 52 MG/DL — LOW
HGB BLD-MCNC: 8.1 G/DL — LOW (ref 14–18)
LIPID PNL WITH DIRECT LDL SERPL: 38 MG/DL — SIGNIFICANT CHANGE UP
LYMPHOCYTES # BLD AUTO: 1 K/UL — SIGNIFICANT CHANGE UP (ref 1–4.8)
LYMPHOCYTES # BLD AUTO: 16.3 % — LOW (ref 20–55)
MCHC RBC-ENTMCNC: 26.5 PG — LOW (ref 27–31)
MCHC RBC-ENTMCNC: 32.8 G/DL — SIGNIFICANT CHANGE UP (ref 32–36)
MCV RBC AUTO: 80.7 FL — SIGNIFICANT CHANGE UP (ref 80–94)
MONOCYTES # BLD AUTO: 0.5 K/UL — SIGNIFICANT CHANGE UP (ref 0–0.8)
MONOCYTES NFR BLD AUTO: 7.8 % — SIGNIFICANT CHANGE UP (ref 3–10)
NEUTROPHILS # BLD AUTO: 4.6 K/UL — SIGNIFICANT CHANGE UP (ref 1.8–8)
NEUTROPHILS NFR BLD AUTO: 74.9 % — HIGH (ref 37–73)
PLATELET # BLD AUTO: 149 K/UL — LOW (ref 150–400)
POTASSIUM SERPL-MCNC: 4 MMOL/L — SIGNIFICANT CHANGE UP (ref 3.5–5.3)
POTASSIUM SERPL-SCNC: 4 MMOL/L — SIGNIFICANT CHANGE UP (ref 3.5–5.3)
PROT 24H UR-MRATE: 1159 MG/24HR — HIGH (ref 50–100)
PROT ?TM UR-MCNC: 61 MG/DL — HIGH (ref 0–12)
PROT/CREAT UR-RTO: 1.9 RATIO — SIGNIFICANT CHANGE UP
RBC # BLD: 3.06 M/UL — LOW (ref 4.6–6.2)
RBC # FLD: 13.9 % — SIGNIFICANT CHANGE UP (ref 11–15.6)
SODIUM SERPL-SCNC: 145 MMOL/L — SIGNIFICANT CHANGE UP (ref 135–145)
TOTAL CHOLESTEROL/HDL RATIO MEASUREMENT: 2 RATIO — LOW (ref 3.4–9.6)
TOTAL VOLUME - 24 HOUR: 1900 ML — SIGNIFICANT CHANGE UP
TRIGL SERPL-MCNC: 73 MG/DL — SIGNIFICANT CHANGE UP (ref 10–200)
URINE CREATININE CALCULATION: 0.6 G/24 HR — LOW (ref 1–2)
WBC # BLD: 6.1 K/UL — SIGNIFICANT CHANGE UP (ref 4.8–10.8)
WBC # FLD AUTO: 6.1 K/UL — SIGNIFICANT CHANGE UP (ref 4.8–10.8)

## 2017-08-08 PROCEDURE — 99233 SBSQ HOSP IP/OBS HIGH 50: CPT

## 2017-08-08 RX ORDER — INSULIN GLARGINE 100 [IU]/ML
4 INJECTION, SOLUTION SUBCUTANEOUS EVERY MORNING
Qty: 0 | Refills: 0 | Status: DISCONTINUED | OUTPATIENT
Start: 2017-08-08 | End: 2017-08-09

## 2017-08-08 RX ADMIN — SODIUM CHLORIDE 3 MILLILITER(S): 9 INJECTION INTRAMUSCULAR; INTRAVENOUS; SUBCUTANEOUS at 14:21

## 2017-08-08 RX ADMIN — Medication 650 MILLIGRAM(S): at 17:16

## 2017-08-08 RX ADMIN — IRON SUCROSE 110 MILLIGRAM(S): 20 INJECTION, SOLUTION INTRAVENOUS at 14:23

## 2017-08-08 RX ADMIN — SODIUM CHLORIDE 3 MILLILITER(S): 9 INJECTION INTRAMUSCULAR; INTRAVENOUS; SUBCUTANEOUS at 04:53

## 2017-08-08 RX ADMIN — HEPARIN SODIUM 5000 UNIT(S): 5000 INJECTION INTRAVENOUS; SUBCUTANEOUS at 06:58

## 2017-08-08 RX ADMIN — Medication 100 MILLIGRAM(S): at 06:59

## 2017-08-08 RX ADMIN — HEPARIN SODIUM 5000 UNIT(S): 5000 INJECTION INTRAVENOUS; SUBCUTANEOUS at 14:23

## 2017-08-08 RX ADMIN — Medication 100 MILLIGRAM(S): at 12:16

## 2017-08-08 RX ADMIN — HEPARIN SODIUM 5000 UNIT(S): 5000 INJECTION INTRAVENOUS; SUBCUTANEOUS at 22:46

## 2017-08-08 RX ADMIN — Medication 1 TABLET(S): at 14:23

## 2017-08-08 RX ADMIN — Medication 650 MILLIGRAM(S): at 06:58

## 2017-08-08 RX ADMIN — Medication 325 MILLIGRAM(S): at 12:16

## 2017-08-08 RX ADMIN — Medication 1 DROP(S): at 17:16

## 2017-08-08 RX ADMIN — Medication 2: at 12:17

## 2017-08-08 RX ADMIN — TAMSULOSIN HYDROCHLORIDE 0.4 MILLIGRAM(S): 0.4 CAPSULE ORAL at 22:46

## 2017-08-08 RX ADMIN — SODIUM CHLORIDE 3 MILLILITER(S): 9 INJECTION INTRAMUSCULAR; INTRAVENOUS; SUBCUTANEOUS at 22:50

## 2017-08-08 RX ADMIN — Medication 8: at 17:16

## 2017-08-08 RX ADMIN — Medication 15 UNIT(S): at 22:46

## 2017-08-08 RX ADMIN — ATORVASTATIN CALCIUM 20 MILLIGRAM(S): 80 TABLET, FILM COATED ORAL at 22:46

## 2017-08-08 RX ADMIN — LISINOPRIL 5 MILLIGRAM(S): 2.5 TABLET ORAL at 06:59

## 2017-08-08 RX ADMIN — Medication 1 DROP(S): at 06:59

## 2017-08-08 RX ADMIN — AMLODIPINE BESYLATE 10 MILLIGRAM(S): 2.5 TABLET ORAL at 06:58

## 2017-08-08 RX ADMIN — Medication 100 MILLIGRAM(S): at 17:16

## 2017-08-08 NOTE — PROGRESS NOTE ADULT - ASSESSMENT
73 y/o male with advanced CKD with possible acute on chronic Kidney disease, hyperkalemia, DM, HTN, HLD, Gout, Glaucoma, Anemia who was admitted due to CKD and hyperkalemia. Pt refer to feel better than yesterday, he has been afebrile and blood sugar is well controlled. Pt was evaluated by nephrology and he recommend ACE as part of his tx. 73 y/o male with advanced CKD with possible acute on chronic Kidney disease, hyperkalemia, DM, HTN, HLD, Gout, Glaucoma, Anemia who was admitted due to CKD and hyperkalemia. Pt refer to feel better than yesterday, he has been afebrile and blood sugar is well controlled. Pt was evaluated by nephrology and he recommend ACE as part of his tx.    8/8/2017  Nephrology note appreciated. Patient has no hydronephrosis on renal sono;   Diabetic nephropathy is suspected cause of kidney damage, UA and 24 hr urine study for volume and protein ongoing;  Patient has elevated total protein in urine;    Patient started on ACEi- lisinopril 5 mg po q day;  Anemia likely 2/2 CKD TS 18% cont 5 days venofer and EPO Q wk  Pending vit D and PTH will start po bicarb for Met acidosis; Needs to be on a renal diet, pending evaluation by dietician for renal and diabetic diet;

## 2017-08-09 VITALS
TEMPERATURE: 97 F | OXYGEN SATURATION: 99 % | DIASTOLIC BLOOD PRESSURE: 74 MMHG | SYSTOLIC BLOOD PRESSURE: 142 MMHG | RESPIRATION RATE: 18 BRPM | HEART RATE: 74 BPM

## 2017-08-09 LAB
ALBUMIN SERPL ELPH-MCNC: 3.5 G/DL — SIGNIFICANT CHANGE UP (ref 3.3–5.2)
ALP SERPL-CCNC: 94 U/L — SIGNIFICANT CHANGE UP (ref 40–120)
ALT FLD-CCNC: 15 U/L — SIGNIFICANT CHANGE UP
ANION GAP SERPL CALC-SCNC: 16 MMOL/L — SIGNIFICANT CHANGE UP (ref 5–17)
AST SERPL-CCNC: 20 U/L — SIGNIFICANT CHANGE UP
BILIRUB SERPL-MCNC: 0.3 MG/DL — LOW (ref 0.4–2)
BUN SERPL-MCNC: 63 MG/DL — HIGH (ref 8–20)
CALCIUM SERPL-MCNC: 8.3 MG/DL — LOW (ref 8.6–10.2)
CHLORIDE SERPL-SCNC: 101 MMOL/L — SIGNIFICANT CHANGE UP (ref 98–107)
CO2 SERPL-SCNC: 27 MMOL/L — SIGNIFICANT CHANGE UP (ref 22–29)
CREAT SERPL-MCNC: 4 MG/DL — HIGH (ref 0.5–1.3)
GLUCOSE SERPL-MCNC: 90 MG/DL — SIGNIFICANT CHANGE UP (ref 70–115)
HCT VFR BLD CALC: 23.4 % — LOW (ref 42–52)
HGB BLD-MCNC: 7.5 G/DL — LOW (ref 14–18)
MCHC RBC-ENTMCNC: 25.9 PG — LOW (ref 27–31)
MCHC RBC-ENTMCNC: 32.1 G/DL — SIGNIFICANT CHANGE UP (ref 32–36)
MCV RBC AUTO: 80.7 FL — SIGNIFICANT CHANGE UP (ref 80–94)
PHOSPHATE SERPL-MCNC: 4.4 MG/DL — SIGNIFICANT CHANGE UP (ref 2.4–4.7)
PLATELET # BLD AUTO: 156 K/UL — SIGNIFICANT CHANGE UP (ref 150–400)
POTASSIUM SERPL-MCNC: 3.9 MMOL/L — SIGNIFICANT CHANGE UP (ref 3.5–5.3)
POTASSIUM SERPL-SCNC: 3.9 MMOL/L — SIGNIFICANT CHANGE UP (ref 3.5–5.3)
PROT SERPL-MCNC: 6.1 G/DL — LOW (ref 6.6–8.7)
RBC # BLD: 2.9 M/UL — LOW (ref 4.6–6.2)
RBC # FLD: 13.9 % — SIGNIFICANT CHANGE UP (ref 11–15.6)
SODIUM SERPL-SCNC: 144 MMOL/L — SIGNIFICANT CHANGE UP (ref 135–145)
URATE SERPL-MCNC: 6.7 MG/DL — SIGNIFICANT CHANGE UP (ref 3.4–7)
WBC # BLD: 6 K/UL — SIGNIFICANT CHANGE UP (ref 4.8–10.8)
WBC # FLD AUTO: 6 K/UL — SIGNIFICANT CHANGE UP (ref 4.8–10.8)

## 2017-08-09 PROCEDURE — 84100 ASSAY OF PHOSPHORUS: CPT

## 2017-08-09 PROCEDURE — 99239 HOSP IP/OBS DSCHRG MGMT >30: CPT

## 2017-08-09 PROCEDURE — 85027 COMPLETE CBC AUTOMATED: CPT

## 2017-08-09 PROCEDURE — 83036 HEMOGLOBIN GLYCOSYLATED A1C: CPT

## 2017-08-09 PROCEDURE — 84156 ASSAY OF PROTEIN URINE: CPT

## 2017-08-09 PROCEDURE — 81001 URINALYSIS AUTO W/SCOPE: CPT

## 2017-08-09 PROCEDURE — 84466 ASSAY OF TRANSFERRIN: CPT

## 2017-08-09 PROCEDURE — 71045 X-RAY EXAM CHEST 1 VIEW: CPT

## 2017-08-09 PROCEDURE — 80061 LIPID PANEL: CPT

## 2017-08-09 PROCEDURE — 93005 ELECTROCARDIOGRAM TRACING: CPT

## 2017-08-09 PROCEDURE — 99285 EMERGENCY DEPT VISIT HI MDM: CPT | Mod: 25

## 2017-08-09 PROCEDURE — 85045 AUTOMATED RETICULOCYTE COUNT: CPT

## 2017-08-09 PROCEDURE — 80048 BASIC METABOLIC PNL TOTAL CA: CPT

## 2017-08-09 PROCEDURE — 85730 THROMBOPLASTIN TIME PARTIAL: CPT

## 2017-08-09 PROCEDURE — 93880 EXTRACRANIAL BILAT STUDY: CPT

## 2017-08-09 PROCEDURE — 96374 THER/PROPH/DIAG INJ IV PUSH: CPT

## 2017-08-09 PROCEDURE — 85610 PROTHROMBIN TIME: CPT

## 2017-08-09 PROCEDURE — 83735 ASSAY OF MAGNESIUM: CPT

## 2017-08-09 PROCEDURE — 87493 C DIFF AMPLIFIED PROBE: CPT

## 2017-08-09 PROCEDURE — 36415 COLL VENOUS BLD VENIPUNCTURE: CPT

## 2017-08-09 PROCEDURE — 93970 EXTREMITY STUDY: CPT

## 2017-08-09 PROCEDURE — 83880 ASSAY OF NATRIURETIC PEPTIDE: CPT

## 2017-08-09 PROCEDURE — 84550 ASSAY OF BLOOD/URIC ACID: CPT

## 2017-08-09 PROCEDURE — 82728 ASSAY OF FERRITIN: CPT

## 2017-08-09 PROCEDURE — 83550 IRON BINDING TEST: CPT

## 2017-08-09 PROCEDURE — 76775 US EXAM ABDO BACK WALL LIM: CPT

## 2017-08-09 PROCEDURE — 84484 ASSAY OF TROPONIN QUANT: CPT

## 2017-08-09 PROCEDURE — 80053 COMPREHEN METABOLIC PANEL: CPT

## 2017-08-09 RX ORDER — TAMSULOSIN HYDROCHLORIDE 0.4 MG/1
1 CAPSULE ORAL
Qty: 0 | Refills: 0 | COMMUNITY

## 2017-08-09 RX ORDER — ATORVASTATIN CALCIUM 80 MG/1
1 TABLET, FILM COATED ORAL
Qty: 0 | Refills: 0 | COMMUNITY

## 2017-08-09 RX ORDER — PREGABALIN 225 MG/1
1 CAPSULE ORAL
Qty: 30 | Refills: 0 | OUTPATIENT
Start: 2017-08-09 | End: 2017-09-08

## 2017-08-09 RX ORDER — ASCORBIC ACID 60 MG
1 TABLET,CHEWABLE ORAL
Qty: 30 | Refills: 0 | OUTPATIENT
Start: 2017-08-09 | End: 2017-09-08

## 2017-08-09 RX ORDER — LABETALOL HCL 100 MG
1 TABLET ORAL
Qty: 60 | Refills: 0 | OUTPATIENT
Start: 2017-08-09 | End: 2017-09-08

## 2017-08-09 RX ORDER — ASCORBIC ACID 60 MG
1 TABLET,CHEWABLE ORAL
Qty: 0 | Refills: 0 | COMMUNITY

## 2017-08-09 RX ORDER — AMLODIPINE BESYLATE 2.5 MG/1
1 TABLET ORAL
Qty: 30 | Refills: 0 | OUTPATIENT
Start: 2017-08-09 | End: 2017-09-08

## 2017-08-09 RX ORDER — TIMOLOL 0.5 %
1 DROPS OPHTHALMIC (EYE)
Qty: 1 | Refills: 0 | OUTPATIENT
Start: 2017-08-09 | End: 2017-09-08

## 2017-08-09 RX ORDER — INSULIN DETEMIR 100/ML (3)
15 INSULIN PEN (ML) SUBCUTANEOUS
Qty: 1 | Refills: 0 | OUTPATIENT
Start: 2017-08-09 | End: 2017-09-08

## 2017-08-09 RX ORDER — ALLOPURINOL 300 MG
1 TABLET ORAL
Qty: 0 | Refills: 0 | COMMUNITY

## 2017-08-09 RX ORDER — FERROUS SULFATE 325(65) MG
1 TABLET ORAL
Qty: 0 | Refills: 0 | COMMUNITY

## 2017-08-09 RX ORDER — PREGABALIN 225 MG/1
1 CAPSULE ORAL
Qty: 0 | Refills: 0 | COMMUNITY

## 2017-08-09 RX ORDER — LISINOPRIL 2.5 MG/1
1 TABLET ORAL
Qty: 30 | Refills: 0 | OUTPATIENT
Start: 2017-08-09 | End: 2017-09-08

## 2017-08-09 RX ORDER — ATORVASTATIN CALCIUM 80 MG/1
1 TABLET, FILM COATED ORAL
Qty: 30 | Refills: 0 | OUTPATIENT
Start: 2017-08-09 | End: 2017-09-08

## 2017-08-09 RX ORDER — ALLOPURINOL 300 MG
1 TABLET ORAL
Qty: 30 | Refills: 0 | OUTPATIENT
Start: 2017-08-09 | End: 2017-09-08

## 2017-08-09 RX ORDER — TIMOLOL 0.5 %
1 DROPS OPHTHALMIC (EYE)
Qty: 0 | Refills: 0 | COMMUNITY

## 2017-08-09 RX ORDER — AMLODIPINE BESYLATE 2.5 MG/1
1 TABLET ORAL
Qty: 0 | Refills: 0 | COMMUNITY

## 2017-08-09 RX ORDER — FERROUS SULFATE 325(65) MG
1 TABLET ORAL
Qty: 90 | Refills: 0 | OUTPATIENT
Start: 2017-08-09 | End: 2017-09-08

## 2017-08-09 RX ORDER — INSULIN DETEMIR 100/ML (3)
15 INSULIN PEN (ML) SUBCUTANEOUS
Qty: 0 | Refills: 0 | COMMUNITY

## 2017-08-09 RX ORDER — TAMSULOSIN HYDROCHLORIDE 0.4 MG/1
1 CAPSULE ORAL
Qty: 30 | Refills: 0 | OUTPATIENT
Start: 2017-08-09 | End: 2017-09-08

## 2017-08-09 RX ORDER — INSULIN GLARGINE 100 [IU]/ML
4 INJECTION, SOLUTION SUBCUTANEOUS
Qty: 0 | Refills: 0 | COMMUNITY
Start: 2017-08-09

## 2017-08-09 RX ADMIN — Medication 100 MILLIGRAM(S): at 12:44

## 2017-08-09 RX ADMIN — IRON SUCROSE 110 MILLIGRAM(S): 20 INJECTION, SOLUTION INTRAVENOUS at 12:44

## 2017-08-09 RX ADMIN — Medication 2: at 17:49

## 2017-08-09 RX ADMIN — Medication 1 DROP(S): at 17:49

## 2017-08-09 RX ADMIN — AMLODIPINE BESYLATE 10 MILLIGRAM(S): 2.5 TABLET ORAL at 05:23

## 2017-08-09 RX ADMIN — Medication 100 MILLIGRAM(S): at 17:49

## 2017-08-09 RX ADMIN — HEPARIN SODIUM 5000 UNIT(S): 5000 INJECTION INTRAVENOUS; SUBCUTANEOUS at 14:07

## 2017-08-09 RX ADMIN — Medication 1 DROP(S): at 05:23

## 2017-08-09 RX ADMIN — HEPARIN SODIUM 5000 UNIT(S): 5000 INJECTION INTRAVENOUS; SUBCUTANEOUS at 05:23

## 2017-08-09 RX ADMIN — Medication 1 TABLET(S): at 12:44

## 2017-08-09 RX ADMIN — SODIUM CHLORIDE 3 MILLILITER(S): 9 INJECTION INTRAMUSCULAR; INTRAVENOUS; SUBCUTANEOUS at 13:42

## 2017-08-09 RX ADMIN — INSULIN GLARGINE 4 UNIT(S): 100 INJECTION, SOLUTION SUBCUTANEOUS at 10:44

## 2017-08-09 RX ADMIN — SODIUM CHLORIDE 3 MILLILITER(S): 9 INJECTION INTRAMUSCULAR; INTRAVENOUS; SUBCUTANEOUS at 05:22

## 2017-08-09 RX ADMIN — Medication 650 MILLIGRAM(S): at 05:23

## 2017-08-09 RX ADMIN — LISINOPRIL 5 MILLIGRAM(S): 2.5 TABLET ORAL at 05:23

## 2017-08-09 RX ADMIN — Medication 650 MILLIGRAM(S): at 17:49

## 2017-08-09 NOTE — PROGRESS NOTE ADULT - ASSESSMENT
ERYN on CKD vs progression of CKD suspect progression   No hydro on renal sono  Suspect etiology is diabetic nephropathy   +proteinuria 1159mg on 24hr urine study  -No neeed for HD currently but suspect will need in near future will consult vasc sc for AVF creation while he id hospitalized  -Cont ACEi- lisinopril 5 mg po q day for proteinuria  Anemia likely 2/2 CKD TS 18% cont 5 days venofer and EPO Q wk  RO: will check vit D and PTH will start po bicarb for MA  Needs to be on a renal diet needs dietician education renal and diabetic diet

## 2017-08-09 NOTE — DISCHARGE NOTE ADULT - PLAN OF CARE
stable asymptomatic glucose control blood pressure control follow up with primary care physician and nephrology   take medication as prescribed follow up with primary care physician  take medication as prescribed follow up with primary care physician & nephrology   take medication as prescribed

## 2017-08-09 NOTE — PROGRESS NOTE ADULT - ATTENDING COMMENTS
Diabetic renal  diet, D/C iv fluid. Venofer, epogen.
per renal   BP control
HD continue   anemia secondary to CKD
for d/c home and outpt f/u

## 2017-08-09 NOTE — PROGRESS NOTE ADULT - PROBLEM SELECTOR PLAN 2
Trend labs to see where patient plateaus.  -follow up of Cr and BUN  -treatment w/ lisinopril 5 mg was recommend by nephrology
Trend labs to see where patient plateaus.  -follow up of Cr and BUN  -treatment w/ lisinopril 5 mg was recommend by nephrology
Trend labs to see where patient plateaus. Likely will need Vascular and vein mapping for anticipated HD in near future. SW/CM as patient has no SS number and will need o/p serviced ie medications, follow up, possible HD in near future. Avoid nephrotoxic drugs, renally dose needed medications. Nephrovite, renal diet.  U/A ordered
f/s uncontrolled, recently AM Ned tirado added, willl reassess for response to treatment;    -A1c=7.3;    -Levemir 20 units at bed time  -Humalog sliding scale  -diet diabetic   -fingerstick

## 2017-08-09 NOTE — DISCHARGE NOTE ADULT - MEDICATION SUMMARY - MEDICATIONS TO CHANGE
I will SWITCH the dose or number of times a day I take the medications listed below when I get home from the hospital:    allopurinol 100 mg oral tablet  -- 1 tab(s) by mouth 2 times a day

## 2017-08-09 NOTE — PROGRESS NOTE ADULT - PROBLEM SELECTOR PLAN 7
Has resolved, responded well to treatment; continue to follow K  Due to reduced GFR and decreased distal delivery of Na. AG normal, no EKG changes. Kaexylate given, Bicarb drip ordered as per Renal, No obstruction on renal sono.   - resolved

## 2017-08-09 NOTE — PROGRESS NOTE ADULT - PROBLEM SELECTOR PLAN 1
Due to reduced GFR and decreased distal delivery of Na. AG normal, no EKG changes. Kaexylate given, Bicarb drip ordered as per Renal, No obstruction on renal sono.   - resolved
Due to reduced GFR and decreased distal delivery of Na. AG normal, no EKG changes. Kaexylate given, Bicarb drip ordered as per Renal, No obstruction on renal sono.   - resolved
Due to reduced GFR and decreased distal delivery of Na. AG normal, no EKG changes. Kaexylate given, Bicarb drip ordered as per Renal, No obstruction on renal sono.   K WNL (4.8)
glucerna added to diet;     Trend labs to see where patient plateaus.  -follow up of Cr and BUN  -treatment w/ lisinopril 5 mg was recommend by nephrology

## 2017-08-09 NOTE — DISCHARGE NOTE ADULT - CARE PROVIDER_API CALL
[facility],   [facility]  Address: [address] [place] [address], [place] [address]  Phone: [phone]  Phone: (   )    -  Fax: (   )    -    Ky Medrano), Internal Medicine; Nephrology  [address] [place] [id number]  Phone: [phone]  Fax: [phone]

## 2017-08-09 NOTE — PROGRESS NOTE ADULT - PROBLEM SELECTOR PROBLEM 2
Acute renal failure superimposed on chronic kidney disease, unspecified CKD stage, unspecified acute renal failure type
Type 2 diabetes mellitus with stage 5 chronic kidney disease not on chronic dialysis, with long-term current use of insulin

## 2017-08-09 NOTE — PROGRESS NOTE ADULT - PROBLEM SELECTOR PLAN 4
Likely due to advanced renal disease as indices favor AOCD  Iron 325 mg x day
-flomax 0.4 mg one tablet x day
Likely due to advanced renal disease as indices favor AOCD  Iron 325 mg x day
Likely due to advanced renal disease as indices favor AOCD, stool occult blood.  Iron studies are all WNL except for total serum iron. Iron supplementation may help.

## 2017-08-09 NOTE — PROGRESS NOTE ADULT - PROBLEM SELECTOR PROBLEM 7
Hyperlipidemia, unspecified hyperlipidemia type
Hyperkalemia, diminished renal excretion
Hyperlipidemia, unspecified hyperlipidemia type
Hyperlipidemia, unspecified hyperlipidemia type

## 2017-08-09 NOTE — PROGRESS NOTE ADULT - SUBJECTIVE AND OBJECTIVE BOX
CC: itching and leg swelling     Patient seen and examined at bedside, No acute overnight events.  PT denies any pain, leg swelling & itching reduced.     Denies fever, chest pain, SOB, abdominal pain, diarrhea, constipation, calf pain, no abdominal pain   Patient ambulating, eating well, voiding and last BM was yesterday    Cardiac monitor reviewed; No acute events overnight, no alarms      ROS:  General- no fever/chills  Resp- no sob/cough  Cardio- no chest pain, or palpatations  GI- no abdominal pain, no diarrhea  Ext- no calf pain    Vital Signs Last 24 Hrs  T(C): 36.4 (08 Aug 2017 07:01), Max: 36.9 (07 Aug 2017 21:15)  T(F): 97.5 (08 Aug 2017 07:01), Max: 98.5 (07 Aug 2017 21:15)  Afebrile  HR: 67 (08 Aug 2017 04:57) (67 - 82)  BP: 123/75 (08 Aug 2017 04:57) (123/75 - 141/66)  normotensive  RR: 18 (08 Aug 2017 04:57) (16 - 20)  SpO2: 92% (08 Aug 2017 04:57) (92% - 100%)      Physical exam   GENERAL: NAD, well-groomed, well-developed  HEAD:  Atraumatic, Normocephalic  EYES: EOMI, PERRLA, conjunctiva and sclera clear  ENMT:  Moist mucous membranes, Good dentition, No lesions  NECK: Supple, No JVD, Normal thyroid  LUNG: CTAB, No rales, rhonchi, wheezing, or rubs  HEART: S1, S2, Regular rate and rhythm; No murmurs, rubs, or gallops  ABDOMEN: Soft, Nontender, Nondistended; Bowel sounds present  EXTREMITIES:  1+ Peripheral Pulses, No clubbing, cyanosis, or edema  LYMPH: No lymphadenopathy noted  NERVOUS SYSTEM:  Alert & Oriented X3, Good concentration; Motor Strength 5/5 B/L upper and lower extremities; DTRs 2+ intact and symmetric  SKIN: No rashes or lesions       CAPILLARY BLOOD GLUCOSE  285 (07 Aug 2017 21:15)  143 (07 Aug 2017 17:10)  109 (07 Aug 2017 08:10)    109/   143/  285  8am/  5pm/   9pm    Levemir 15 at night  ss 4units (12pm), 6units (9pm)       Labs:      146<H>  |  106  |  68.0<H>  ----------------------------<  49<LL>  3.8   |  25.0  |  3.55<H>    Ca    8.5<L>      07 Aug 2017 06:08          Urinalysis Basic - ( 06 Aug 2017 17:02 )    Color: Yellow / Appearance: Clear / S.010 / pH: x  Gluc: x / Ketone: Negative  / Bili: Negative / Urobili: Negative mg/dL   Blood: x / Protein: 100 mg/dL / Nitrite: Negative   Leuk Esterase: Negative / RBC: 0-2 /HPF / WBC 0-2   Sq Epi: x / Non Sq Epi: x / Bacteria: x                @ 07:01  -   @ 07:00  --------------------------------------------------------  IN: 375 mL / OUT: 450 mL / NET: -75 mL     @ 07: @ 06:53  --------------------------------------------------------  IN: 825 mL / OUT: 900 mL / NET: -75 mL        MEDICATIONS  (STANDING):  sodium chloride 0.45% 1000 milliLiter(s) (75 mL/Hr) IV Continuous <Continuous>  heparin  Injectable 5000 Unit(s) SubCutaneous every 8 hours  sodium chloride 0.9% lock flush 3 milliLiter(s) IV Push every 8 hours  insulin lispro (HumaLOG) corrective regimen sliding scale   SubCutaneous Before meals and at bedtime  dextrose 5%. 1000 milliLiter(s) (50 mL/Hr) IV Continuous <Continuous>  dextrose 50% Injectable 12.5 Gram(s) IV Push once  amLODIPine   Tablet 10 milliGRAM(s) Oral daily  tamsulosin 0.4 milliGRAM(s) Oral at bedtime  atorvastatin 20 milliGRAM(s) Oral at bedtime  timolol 0.5% Solution 1 Drop(s) Both EYES two times a day  allopurinol 100 milliGRAM(s) Oral daily  Nephro-silvia 1 Tablet(s) Oral daily  iron sucrose IVPB 200 milliGRAM(s) IV Intermittent daily  epoetin monique Injectable 87760 Unit(s) SubCutaneous <User Schedule>  labetalol 100 milliGRAM(s) Oral two times a day  lisinopril 5 milliGRAM(s) Oral daily  sodium bicarbonate 650 milliGRAM(s) Oral two times a day  ferrous    sulfate 325 milliGRAM(s) Oral daily  atorvastatin 20 milliGRAM(s) Oral at bedtime  insulin detemir injectable (LEVEMIR) 15 Unit(s) SubCutaneous at bedtime    MEDICATIONS  (PRN):  ondansetron Injectable 4 milliGRAM(s) IV Push every 6 hours PRN Nausea  dextrose Gel 1 Dose(s) Oral once PRN Blood Glucose LESS THAN 70 milliGRAM(s)/deciliter  glucagon  Injectable 1 milliGRAM(s) IntraMuscular once PRN Glucose LESS THAN 70 milligrams/deciliter
CC: itching and leg swelling     Patient seen and examined at bedside, No acute overnight events.  PT denies any pain, leg swelling & itching reduced.     Denies fever, chest pain, SOB, abdominal pain, diarrhea, constipation, calf pain, no abdominal pain   Patient ambulating, eating well, voiding and last BM was yesterday    Cardiac monitor reviewed; No acute events overnight, no alarms      ROS:  General- no fever/chills  Resp- no sob/cough  Cardio- no chest pain, or palpatations  GI- no abdominal pain, no diarrhea  Ext- no calf pain    Vital Signs Last 24 Hrs  Vital Signs Last 24 Hrs  T(C): 36.8 (09 Aug 2017 05:07), Max: 36.8 (08 Aug 2017 23:00)  T(F): 98.2 (09 Aug 2017 05:07), Max: 98.2 (08 Aug 2017 23:00)  HR: 69 (09 Aug 2017 05:07) (64 - 76)  BP: 112/52 (09 Aug 2017 05:07) (112/52 - 140/60)  RR: 20 (09 Aug 2017 05:07) (16 - 20)  SpO2: 96% (09 Aug 2017 05:07) (96% - 99%)    Physical Exam   GENERAL:Adult male laying in bed, NAD, well-groomed, well-developed  HEAD:  Atraumatic, Normocephalic  EYES: EOMI, PERRLA, conjunctiva and sclera clear  ENMT:  Moist mucous membranes, Good dentition, No lesions  NECK: Supple, No JVD, Normal thyroid  LUNG: CTAB, No rales, rhonchi, wheezing, or rubs  HEART: S1, S2, Regular rate and rhythm; No murmurs, rubs, or gallops  ABDOMEN: Soft, Nontender, Nondistended; Bowel sounds present  EXTREMITIES:  1+ Peripheral Pulses, No clubbing, cyanosis, or edema  LYMPH: No lymphadenopathy noted  NERVOUS SYSTEM:  Alert & Oriented X3, Good concentration; Motor Strength 5/5 B/L upper and lower extremities; DTRs 2+ intact and symmetric  SKIN: No rashes or lesions       CAPILLARY BLOOD GLUCOSE  150 (08 Aug 2017 23:00)  330 (08 Aug 2017 17:10)  236 (08 Aug 2017 12:15)  131 (08 Aug 2017 08:19)    f/s=   131/    236/     330/     150        8am/    12pm/   5pm/   11pm    Levemir 15 at night  Lantus 4 units in AM  ss 2units (12pm), 8 units (9pm)       Labs:      146<H>  |  106  |  68.0<H>  ----------------------------<  49<LL>  3.8   |  25.0  |  3.55<H>    Ca    8.5<L>      07 Aug 2017 06:08          Urinalysis Basic - ( 06 Aug 2017 17:02 )    Color: Yellow / Appearance: Clear / S.010 / pH: x  Gluc: x / Ketone: Negative  / Bili: Negative / Urobili: Negative mg/dL   Blood: x / Protein: 100 mg/dL / Nitrite: Negative   Leuk Esterase: Negative / RBC: 0-2 /HPF / WBC 0-2   Sq Epi: x / Non Sq Epi: x / Bacteria: x             @ 07: @ 07:00  --------------------------------------------------------  IN: 960 mL / OUT: 3 mL / NET: 957 mL     @ 07:09 @ 05:57  --------------------------------------------------------  IN: 240 mL / OUT: 0 mL / NET: 240 mL          MEDICATIONS  (STANDING):  heparin  Injectable 5000 Unit(s) SubCutaneous every 8 hours  sodium chloride 0.9% lock flush 3 milliLiter(s) IV Push every 8 hours  insulin lispro (HumaLOG) corrective regimen sliding scale   SubCutaneous Before meals and at bedtime  dextrose 5%. 1000 milliLiter(s) (50 mL/Hr) IV Continuous <Continuous>  dextrose 50% Injectable 12.5 Gram(s) IV Push once  amLODIPine   Tablet 10 milliGRAM(s) Oral daily  tamsulosin 0.4 milliGRAM(s) Oral at bedtime  timolol 0.5% Solution 1 Drop(s) Both EYES two times a day  allopurinol 100 milliGRAM(s) Oral daily  Nephro-silvia 1 Tablet(s) Oral daily  iron sucrose IVPB 200 milliGRAM(s) IV Intermittent daily  epoetin monique Injectable 11474 Unit(s) SubCutaneous <User Schedule>  labetalol 100 milliGRAM(s) Oral two times a day  lisinopril 5 milliGRAM(s) Oral daily  sodium bicarbonate 650 milliGRAM(s) Oral two times a day  atorvastatin 20 milliGRAM(s) Oral at bedtime  insulin detemir injectable (LEVEMIR) 15 Unit(s) SubCutaneous at bedtime  insulin glargine Injectable (LANTUS) 4 Unit(s) SubCutaneous every morning    MEDICATIONS  (PRN):  ondansetron Injectable 4 milliGRAM(s) IV Push every 6 hours PRN Nausea  dextrose Gel 1 Dose(s) Oral once PRN Blood Glucose LESS THAN 70 milliGRAM(s)/deciliter  glucagon  Injectable 1 milliGRAM(s) IntraMuscular once PRN Glucose LESS THAN 70 milligrams/deciliter
NEPHROLOGY INTERVAL HPI/OVERNIGHT EVENTS:    Examined earlier  Clinically unchanged    MEDICATIONS  (STANDING):  heparin  Injectable 5000 Unit(s) SubCutaneous every 8 hours  sodium chloride 0.9% lock flush 3 milliLiter(s) IV Push every 8 hours  insulin lispro (HumaLOG) corrective regimen sliding scale   SubCutaneous Before meals and at bedtime  dextrose 5%. 1000 milliLiter(s) (50 mL/Hr) IV Continuous <Continuous>  dextrose 50% Injectable 12.5 Gram(s) IV Push once  amLODIPine   Tablet 10 milliGRAM(s) Oral daily  tamsulosin 0.4 milliGRAM(s) Oral at bedtime  timolol 0.5% Solution 1 Drop(s) Both EYES two times a day  allopurinol 100 milliGRAM(s) Oral daily  Nephro-silvia 1 Tablet(s) Oral daily  iron sucrose IVPB 200 milliGRAM(s) IV Intermittent daily  epoetin monique Injectable 39523 Unit(s) SubCutaneous <User Schedule>  labetalol 100 milliGRAM(s) Oral two times a day  lisinopril 5 milliGRAM(s) Oral daily  sodium bicarbonate 650 milliGRAM(s) Oral two times a day  atorvastatin 20 milliGRAM(s) Oral at bedtime  insulin detemir injectable (LEVEMIR) 15 Unit(s) SubCutaneous at bedtime  insulin glargine Injectable (LANTUS) 4 Unit(s) SubCutaneous every morning    MEDICATIONS  (PRN):  ondansetron Injectable 4 milliGRAM(s) IV Push every 6 hours PRN Nausea  dextrose Gel 1 Dose(s) Oral once PRN Blood Glucose LESS THAN 70 milliGRAM(s)/deciliter  glucagon  Injectable 1 milliGRAM(s) IntraMuscular once PRN Glucose LESS THAN 70 milligrams/deciliter      Allergies    pyrilamine (Hives)    Intolerances        Vital Signs Last 24 Hrs  T(C): 36.6 (09 Aug 2017 08:32), Max: 36.8 (08 Aug 2017 23:00)  T(F): 97.9 (09 Aug 2017 08:32), Max: 98.2 (08 Aug 2017 23:00)  HR: 67 (09 Aug 2017 08:32) (67 - 76)  BP: 115/57 (09 Aug 2017 08:32) (112/52 - 140/60)  BP(mean): --  RR: 20 (09 Aug 2017 08:32) (20 - 20)  SpO2: 99% (09 Aug 2017 08:32) (96% - 99%)  Daily     Daily     PHYSICAL EXAM:  GENERAL: appears chronically ill, oriented.  HEAD:  Atraumatic, Normocephalic  EYES: EOMI  NECK: Supple  NERVOUS SYSTEM:  Alert & Oriented X3  CHEST/LUNG: Clear to percussion bilaterally; No rubs  HEART: Regular rate and rhythm; No murmurs  ABDOMEN: Soft, Nontender, Nondistended; Bowel sounds present  EXTREMITIES:  2+ pitting edema    LABS:                        7.5    6.0   )-----------( 156      ( 09 Aug 2017 06:58 )             23.4     08-09    144  |  101  |  63.0<H>  ----------------------------<  90  3.9   |  27.0  |  4.00<H>    Ca    8.3<L>      09 Aug 2017 06:58  Phos  4.4     08-09    TPro  6.1<L>  /  Alb  3.5  /  TBili  0.3<L>  /  DBili  x   /  AST  20  /  ALT  15  /  AlkPhos  94  08-09        Phosphorus Level, Serum: 4.4 mg/dL (08-09 @ 06:58)          RADIOLOGY & ADDITIONAL TESTS:
NEPHROLOGY INTERVAL HPI/OVERNIGHT EVENTS:    Examined earlier  Comfortable    MEDICATIONS  (STANDING):  sodium chloride 0.45% 1000 milliLiter(s) (75 mL/Hr) IV Continuous <Continuous>  heparin  Injectable 5000 Unit(s) SubCutaneous every 8 hours  sodium chloride 0.9% lock flush 3 milliLiter(s) IV Push every 8 hours  insulin lispro (HumaLOG) corrective regimen sliding scale   SubCutaneous Before meals and at bedtime  dextrose 5%. 1000 milliLiter(s) (50 mL/Hr) IV Continuous <Continuous>  dextrose 50% Injectable 12.5 Gram(s) IV Push once  amLODIPine   Tablet 10 milliGRAM(s) Oral daily  insulin detemir injectable (LEVEMIR) 20 Unit(s) SubCutaneous at bedtime  tamsulosin 0.4 milliGRAM(s) Oral at bedtime  atorvastatin 20 milliGRAM(s) Oral at bedtime  timolol 0.5% Solution 1 Drop(s) Both EYES two times a day  allopurinol 100 milliGRAM(s) Oral daily  Nephro-silvia 1 Tablet(s) Oral daily  iron sucrose IVPB 200 milliGRAM(s) IV Intermittent daily  epoetin monique Injectable 91051 Unit(s) SubCutaneous <User Schedule>  labetalol 100 milliGRAM(s) Oral two times a day  lisinopril 5 milliGRAM(s) Oral daily  sodium bicarbonate 650 milliGRAM(s) Oral two times a day  ferrous    sulfate 325 milliGRAM(s) Oral daily  atorvastatin 20 milliGRAM(s) Oral at bedtime    MEDICATIONS  (PRN):  ondansetron Injectable 4 milliGRAM(s) IV Push every 6 hours PRN Nausea  dextrose Gel 1 Dose(s) Oral once PRN Blood Glucose LESS THAN 70 milliGRAM(s)/deciliter  glucagon  Injectable 1 milliGRAM(s) IntraMuscular once PRN Glucose LESS THAN 70 milligrams/deciliter      Allergies    pyrilamine (Hives)    Intolerances        Vital Signs Last 24 Hrs  T(C): 36.7 (07 Aug 2017 08:10), Max: 36.8 (06 Aug 2017 17:37)  T(F): 98.1 (07 Aug 2017 08:10), Max: 98.2 (06 Aug 2017 17:37)  HR: 67 (07 Aug 2017 08:10) (65 - 84)  BP: 124/61 (07 Aug 2017 08:10) (124/61 - 159/69)  BP(mean): --  RR: 16 (07 Aug 2017 08:10) (16 - 18)  SpO2: 100% (07 Aug 2017 08:10) (93% - 100%)  Daily     Daily     PHGENERAL: appears chronically ill, oriented.  HEAD:  Atraumatic, Normocephalic  EYES: EOMI  NECK: Supple  NERVOUS SYSTEM:  Alert & Oriented X3  CHEST/LUNG: Clear to percussion bilaterally; No rubs  HEART: Regular rate and rhythm; No murmurs  ABDOMEN: Soft, Nontender, Nondistended; Bowel sounds present  EXTREMITIES:  2+ pitting edemaYSICAL EXAM:      LABS:                        8.1    7.2   )-----------( 173      ( 06 Aug 2017 05:34 )             25.0     08-07    146<H>  |  106  |  68.0<H>  ----------------------------<  49<LL>  3.8   |  25.0  |  3.55<H>    Ca    8.5<L>      07 Aug 2017 06:08  Phos  4.2     08-06  Mg     2.1     08-06    TPro  7.6  /  Alb  4.4  /  TBili  0.3<L>  /  DBili  x   /  AST  15  /  ALT  14  /  AlkPhos  112  08-05    PT/INR - ( 05 Aug 2017 19:56 )   PT: 10.7 sec;   INR: 0.97 ratio         PTT - ( 05 Aug 2017 19:56 )  PTT:29.8 sec  Urinalysis Basic - ( 06 Aug 2017 17:02 )    Color: Yellow / Appearance: Clear / S.010 / pH: x  Gluc: x / Ketone: Negative  / Bili: Negative / Urobili: Negative mg/dL   Blood: x / Protein: 100 mg/dL / Nitrite: Negative   Leuk Esterase: Negative / RBC: 0-2 /HPF / WBC 0-2   Sq Epi: x / Non Sq Epi: x / Bacteria: x              RADIOLOGY & ADDITIONAL TESTS:
NEPHROLOGY INTERVAL HPI/OVERNIGHT EVENTS:  No new events. OOB in chair eating, son present.    MEDICATIONS  (STANDING):  sodium chloride 0.45% 1000 milliLiter(s) (75 mL/Hr) IV Continuous <Continuous>  heparin  Injectable 5000 Unit(s) SubCutaneous every 8 hours  sodium chloride 0.9% lock flush 3 milliLiter(s) IV Push every 8 hours  insulin lispro (HumaLOG) corrective regimen sliding scale   SubCutaneous Before meals and at bedtime  dextrose 5%. 1000 milliLiter(s) (50 mL/Hr) IV Continuous <Continuous>  dextrose 50% Injectable 12.5 Gram(s) IV Push once  amLODIPine   Tablet 10 milliGRAM(s) Oral daily  insulin detemir injectable (LEVEMIR) 20 Unit(s) SubCutaneous at bedtime  tamsulosin 0.4 milliGRAM(s) Oral at bedtime  atorvastatin 20 milliGRAM(s) Oral at bedtime  timolol 0.5% Solution 1 Drop(s) Both EYES two times a day  allopurinol 100 milliGRAM(s) Oral daily  Nephro-silvia 1 Tablet(s) Oral daily  iron sucrose IVPB 200 milliGRAM(s) IV Intermittent daily  epoetin monique Injectable 31945 Unit(s) SubCutaneous <User Schedule>  labetalol 100 milliGRAM(s) Oral two times a day  lisinopril 5 milliGRAM(s) Oral daily  sodium bicarbonate 650 milliGRAM(s) Oral two times a day  ferrous    sulfate 325 milliGRAM(s) Oral daily  atorvastatin 20 milliGRAM(s) Oral at bedtime    MEDICATIONS  (PRN):  ondansetron Injectable 4 milliGRAM(s) IV Push every 6 hours PRN Nausea  dextrose Gel 1 Dose(s) Oral once PRN Blood Glucose LESS THAN 70 milliGRAM(s)/deciliter  glucagon  Injectable 1 milliGRAM(s) IntraMuscular once PRN Glucose LESS THAN 70 milligrams/deciliter      Allergies    pyrilamine (Hives)    Intolerances        Vital Signs Last 24 Hrs  T(C): 36.7 (07 Aug 2017 08:10), Max: 36.8 (06 Aug 2017 17:37)  T(F): 98.1 (07 Aug 2017 08:10), Max: 98.2 (06 Aug 2017 17:37)  HR: 67 (07 Aug 2017 08:10) (65 - 84)  BP: 124/61 (07 Aug 2017 08:10) (124/61 - 159/69)  BP(mean): --  RR: 16 (07 Aug 2017 08:10) (16 - 18)  SpO2: 100% (07 Aug 2017 08:10) (93% - 100%)  Daily     Daily     PHGENERAL: appears chronically ill, oriented.  HEAD:  Atraumatic, Normocephalic  EYES: EOMI  NECK: Supple  NERVOUS SYSTEM:  Alert & Oriented  CHEST/LUNG: Clear to percussion bilaterally; No rubs  HEART: Regular rate and rhythm; No murmurs  ABDOMEN: Soft, Nontender, Nondistended; Bowel sounds present  EXTREMITIES:  same      LABS:    08-08    145  |  103  |  63.0<H>  ----------------------------<  63<L>  4.0   |  28.0  |  3.73<H>    Ca    8.6      08 Aug 2017 07:10                            8.1    7.2   )-----------( 173      ( 06 Aug 2017 05:34 )             25.0     08-07    146<H>  |  106  |  68.0<H>  ----------------------------<  49<LL>  3.8   |  25.0  |  3.55<H>    Ca    8.5<L>      07 Aug 2017 06:08  Phos  4.2     08-06  Mg     2.1     08-06    TPro  7.6  /  Alb  4.4  /  TBili  0.3<L>  /  DBili  x   /  AST  15  /  ALT  14  /  AlkPhos  112  08-05    PT/INR - ( 05 Aug 2017 19:56 )   PT: 10.7 sec;   INR: 0.97 ratio         PTT - ( 05 Aug 2017 19:56 )  PTT:29.8 sec  Urinalysis Basic - ( 06 Aug 2017 17:02 )      RADIOLOGY & ADDITIONAL TESTS:
Patient is a 72y old  Male who presents with a chief complaint of Leg swelling (06 Aug 2017 00:52)      INTERVAL HPI/OVERNIGHT EVENTS:  72y  Male w/PMH HTN, HLD, Glaucoma, Gout, Chronic anemia, CKD 2/2 HTN/DM w/unknown baseline GFR. Pt recently immigrated from [place], but intends to move back. He is concerned about needing hemodialysis because HD is difficult to get in [place]. Pt is urinating frequently. Pt has to     ANTIMICROBIAL:    CARDIOVASCULAR:  amLODIPine   Tablet 10 milliGRAM(s) Oral daily  tamsulosin 0.4 milliGRAM(s) Oral at bedtime    PULMONARY:    NEUROLOGIC:  ondansetron Injectable 4 milliGRAM(s) IV Push every 6 hours PRN    ONCOLOGIC:    HEMATOLOGIC:  heparin  Injectable 5000 Unit(s) SubCutaneous every 8 hours    GATROINTESTINAL:     MEDS:    ENDO/METABOLIC:  insulin lispro (HumaLOG) corrective regimen sliding scale   SubCutaneous Before meals and at bedtime  dextrose Gel 1 Dose(s) Oral once PRN  dextrose 50% Injectable 12.5 Gram(s) IV Push once  glucagon  Injectable 1 milliGRAM(s) IntraMuscular once PRN  insulin detemir injectable (LEVEMIR) 20 Unit(s) SubCutaneous at bedtime  atorvastatin 20 milliGRAM(s) Oral at bedtime  allopurinol 100 milliGRAM(s) Oral daily    IV FLUID/NUTRITION:  sodium chloride 0.45% 1000 milliLiter(s) IV Continuous <Continuous>  dextrose 5%. 1000 milliLiter(s) IV Continuous <Continuous>  Nephro-silvia 1 Tablet(s) Oral daily    TOPICAL:  timolol 0.5% Solution 1 Drop(s) Both EYES two times a day    IMMUNOLOGIC & OTHER  sodium chloride 0.9% lock flush 3 milliLiter(s) IV Push every 8 hours      Allergies    pyrilamine (Hives)    Intolerances        Vital Signs Last 24 Hrs  T(C): 37 (06 Aug 2017 07:27), Max: 37 (06 Aug 2017 07:27)  T(F): 98.6 (06 Aug 2017 07:27), Max: 98.6 (06 Aug 2017 07:27)  HR: 91 (06 Aug 2017 07:27) (71 - 95)  BP: 175/79 (06 Aug 2017 07:27) (154/78 - 175/79)  BP(mean): 103 (06 Aug 2017 00:52) (103 - 103)  RR: 22 (06 Aug 2017 07:27) (16 - 22)  SpO2: 95% (06 Aug 2017 07:27) (95% - 100%)      Daily Height in cm: 167.64 (05 Aug 2017 15:31)    Daily   I&O's Detail    05 Aug 2017 07:01  -  06 Aug 2017 07:00  --------------------------------------------------------  IN:    sodium chloride 0.45%: 375 mL  Total IN: 375 mL    OUT:    Voided: 450 mL  Total OUT: 450 mL    Total NET: -75 mL    REVIEW OF SYSTEMS:    CONSTITUTIONAL: No fever, weight loss, or fatigue  EYES: No eye pain, visual disturbances, or discharge  ENMT:  No difficulty hearing, tinnitus, vertigo; No sinus or throat pain  NECK: No pain or stiffness  RESPIRATORY: No cough, wheezing, chills or hemoptysis; No shortness of breath  CARDIOVASCULAR: No chest pain, palpitations, dizziness, or leg swelling  GASTROINTESTINAL: No abdominal or epigastric pain. No nausea, vomiting, or hematemesis; No diarrhea or constipation. No melena or hematochezia.  GENITOURINARY: Urinary frequency, nocturia, No dysuria, hematuria, or incontinence  NEUROLOGICAL: No headaches, memory loss, loss of strength, numbness, or tremors  SKIN: No itching, burning, rashes, or lesions   LYMPH NODES: No enlarged glands  ENDOCRINE: No heat or cold intolerance; No hair loss  MUSCULOSKELETAL: Pedal edema, No joint pain; No muscle, back, or extremity pain  PSYCHIATRIC: No depression, anxiety, mood swings, or difficulty sleeping  HEME/LYMPH: No easy bruising, or bleeding gums  ALLERY AND IMMUNOLOGIC: No hives or eczema      PHYSICAL EXAM:    GENERAL: NAD, well-groomed, well-developed, wearing head covering and bushy grey beard  EYES: EOMI, PERRLA, conjunctiva and sclera clear  NECK: Supple, No JVD, Normal thyroid  NERVOUS SYSTEM:  Alert & Oriented X3, Good concentration; Motor Strength 5/5 B/L upper and lower extremities;   CHEST/LUNG: Clear to auscultation bilaterally; No rales, rhonchi, wheezing, or rubs  HEART: Regular rate; No murmurs, rubs, or gallops  ABDOMEN: Soft, Nontender, Nondistended; Bowel sounds present  EXTREMITIES:  2+ pitting pedal edema, 2+ Peripheral Pulses, No clubbing, cyanosis  LYMPH: No lymphadenopathy noted  SKIN: No rashes or lesions  PSYCH: normal affect and mood      LABS:                        8.1    7.2   )-----------( 173      ( 06 Aug 2017 05:34 )             25.0     CBC Full  -  ( 06 Aug 2017 05:34 )  WBC Count : 7.2 K/uL  Hemoglobin : 8.1 g/dL  Hematocrit : 25.0 %  Platelet Count - Automated : 173 K/uL  Mean Cell Volume : 80.1 fl  Mean Cell Hemoglobin : 26.0 pg  Mean Cell Hemoglobin Concentration : 32.4 g/dL  Auto Neutrophil # : 4.7 K/uL  Auto Lymphocyte # : 1.8 K/uL  Auto Monocyte # : 0.5 K/uL  Auto Eosinophil # : 0.2 K/uL  Auto Basophil # : 0.0 K/uL  Auto Neutrophil % : 65.1 %  Auto Lymphocyte % : 24.4 %  Auto Monocyte % : 6.8 %  Auto Eosinophil % : 3.1 %  Auto Basophil % : 0.3 %    08-06    145  |  108<H>  |  70.0<H>  ----------------------------<  187<H>  4.8   |  21.0<L>  |  3.60<H>    Ca    8.7      06 Aug 2017 05:34  Phos  4.2     08-06  Mg     2.1     08-06    TPro  7.6  /  Alb  4.4  /  TBili  0.3<L>  /  DBili  x   /  AST  15  /  ALT  14  /  AlkPhos  112  08-05    CAPILLARY BLOOD GLUCOSE  228 (06 Aug 2017 01:47)  228 (05 Aug 2017 22:30)      PT/INR - ( 05 Aug 2017 19:56 )   PT: 10.7 sec;     INR: 0.97 ratio    PTT - ( 05 Aug 2017 19:56 )  PTT:29.8 sec    Hemoglobin A1C, Whole Blood: 7.3 % (08-06 @ 05:34)      Serum Pro-Brain Natriuretic Peptide: 913 pg/mL (08-05 @ 19:56)      Albumin, Serum: 4.4 g/dL (08-05 @ 19:56)    LIVER FUNCTIONS - ( 05 Aug 2017 19:56 )  Alb: 4.4 g/dL / Pro: 7.6 g/dL / ALK PHOS: 112 U/L / ALT: 14 U/L / AST: 15 U/L / GGT: x             RADIOLOGY & ADDITIONAL TESTS:    < from: US Renal (08.05.17 @ 23:30) >  INTERPRETATION:  CLINICAL INFORMATION: Renal failure.    COMPARISON: None available.    TECHNIQUE: Sonography of the kidneys and bladder.     FINDINGS:    Right kidney:  10.9 x 4.5 x 4.3 cm with midpole cysts measuring 1.3 x 1.3   x 1.6 cm and 2.1 x 1.1 x 1.3 cm. No renal mass, hydronephrosis or calculi.    Left kidney:  9.4 x 4.8 x 4.5 cm with a midpole cyst measuring 1.4 x 1.1   x 1.5 cm. No renal mass, hydronephrosis or calculi.    Urinary bladder: Within normal limits.    Aorta/IVC: Within normal limits.    IMPRESSION:     No evidence of hydronephrosis.      < end of copied text >
CC: itching and leg swelling     Patient seen and examined at bedside, No acute overnight events. PT denies any pain, he report to feel better than yesterday , the swelling in her legs went down and he is having less itching .His blood sugar was 104 mg/dl last night.    Denies fever, chest pain, SOB, abdominal pain, diarrhea, constipation, calf pain, no abdominal pain   Patient ambulating, eating well, voiding and last BM was yesterday    Cardiac monitor reviewed; No acute events overnight, no alarms    VS:   Vital Signs Last 24 Hrs  T(C): 36.6 (07 Aug 2017 04:07), Max: 37 (06 Aug 2017 07:27)  T(F): 97.8 (07 Aug 2017 04:07), Max: 98.6 (06 Aug 2017 07:27)  HR: 65 (07 Aug 2017 04:07) (65 - 91)  BP: 128/59 (07 Aug 2017 04:07) (128/59 - 175/79)  BP(mean): --  RR: 18 (07 Aug 2017 04:07) (16 - 22)  SpO2: 93% (07 Aug 2017 04:07) (93% - 100%)      Physical exam   GENERAL: NAD, well-groomed, well-developed  HEAD:  Atraumatic, Normocephalic  EYES: EOMI, PERRLA, conjunctiva and sclera clear  ENMT:  Moist mucous membranes, Good dentition, No lesions  NECK: Supple, No JVD, Normal thyroid  LUNG: Clear to auscultation bilaterally; No rales, rhonchi, wheezing, or rubs  HEART: Regular rate and rhythm; No murmurs, rubs, or gallops  ABDOMEN: Soft, Nontender, Nondistended; Bowel sounds present  EXTREMITIES:  2+ Peripheral Pulses, No clubbing, cyanosis, or edema  LYMPH: No lymphadenopathy noted  NERVOUS SYSTEM:  Alert & Oriented X3, Good concentration; Motor Strength 5/5 B/L upper and lower extremities; DTRs 2+ intact and symmetric  SKIN: No rashes or lesions           Labs:                        8.1    7.2   )-----------( 173      ( 06 Aug 2017 05:34 )             25.0   08-07    146<H>  |  106  |  68.0<H>  ----------------------------<  49<LL>  3.8   |  25.0  |  3.55<H>    Ca    8.5<L>      07 Aug 2017 06:08  Phos  4.2     08-06  Mg     2.1     08-06    TPro  7.6  /  Alb  4.4  /  TBili  0.3<L>  /  DBili  x   /  AST  15  /  ALT  14  /  AlkPhos  112  08-05 08-05 @ 07:01  - 08-06 @ 07:00  --------------------------------------------------------  IN: 375 mL / OUT: 450 mL / NET: -75 mL    08-06 @ 07:01  - 08-07 @ 06:53  --------------------------------------------------------  IN: 825 mL / OUT: 900 mL / NET: -75 mL        Radiology:  *Pull    Medications:  MEDICATIONS  (STANDING):  sodium chloride 0.45% 1000 milliLiter(s) (75 mL/Hr) IV Continuous <Continuous>  heparin  Injectable 5000 Unit(s) SubCutaneous every 8 hours  sodium chloride 0.9% lock flush 3 milliLiter(s) IV Push every 8 hours  insulin lispro (HumaLOG) corrective regimen sliding scale   SubCutaneous Before meals and at bedtime  dextrose 5%. 1000 milliLiter(s) (50 mL/Hr) IV Continuous <Continuous>  dextrose 50% Injectable 12.5 Gram(s) IV Push once  amLODIPine   Tablet 10 milliGRAM(s) Oral daily  insulin detemir injectable (LEVEMIR) 20 Unit(s) SubCutaneous at bedtime  tamsulosin 0.4 milliGRAM(s) Oral at bedtime  atorvastatin 20 milliGRAM(s) Oral at bedtime  timolol 0.5% Solution 1 Drop(s) Both EYES two times a day  allopurinol 100 milliGRAM(s) Oral daily  Nephro-silvia 1 Tablet(s) Oral daily  iron sucrose IVPB 200 milliGRAM(s) IV Intermittent daily  epoetin monique Injectable 40598 Unit(s) SubCutaneous <User Schedule>  labetalol 100 milliGRAM(s) Oral two times a day  furosemide   Injectable 40 milliGRAM(s) IV Push daily  lisinopril 5 milliGRAM(s) Oral daily  sodium bicarbonate 650 milliGRAM(s) Oral two times a day    MEDICATIONS  (PRN):  ondansetron Injectable 4 milliGRAM(s) IV Push every 6 hours PRN Nausea  dextrose Gel 1 Dose(s) Oral once PRN Blood Glucose LESS THAN 70 milliGRAM(s)/deciliter  glucagon  Injectable 1 milliGRAM(s) IntraMuscular once PRN Glucose LESS THAN 70 milligrams/deciliter

## 2017-08-09 NOTE — DISCHARGE NOTE ADULT - PROVIDER TOKENS
FREE:[LAST:[[facility]],PHONE:[(   )    -],FAX:[(   )    -],ADDRESS:[[facility]  Address: [address], [place] [address]  Phone: [phone]]],TOKEN:'[id number]'

## 2017-08-09 NOTE — DISCHARGE NOTE ADULT - CARE PLAN
Principal Discharge DX:	Acute renal failure superimposed on chronic kidney disease, unspecified CKD stage, unspecified acute renal failure type  Goal:	stable  Instructions for follow-up, activity and diet:	follow up with primary care physician & nephrology   take medication as prescribed  Secondary Diagnosis:	CKD (chronic kidney disease)  Goal:	stable  Instructions for follow-up, activity and diet:	follow up with primary care physician  take medication as prescribed  Secondary Diagnosis:	Anemia  Goal:	asymptomatic  Instructions for follow-up, activity and diet:	follow up with primary care physician  take medication as prescribed  Secondary Diagnosis:	Diabetes  Goal:	glucose control  Instructions for follow-up, activity and diet:	follow up with primary care physician  take medication as prescribed  Secondary Diagnosis:	HTN (hypertension)  Goal:	blood pressure control  Instructions for follow-up, activity and diet:	follow up with primary care physician  take medication as prescribed  Secondary Diagnosis:	Hyperlipemia  Goal:	stable  Instructions for follow-up, activity and diet:	follow up with primary care physician  take medication as prescribed  Instructions for follow-up, activity and diet:	follow up with primary care physician and nephrology   take medication as prescribed

## 2017-08-09 NOTE — PROGRESS NOTE ADULT - PROBLEM SELECTOR PROBLEM 1
Hyperkalemia, diminished renal excretion
Acute renal failure superimposed on chronic kidney disease, unspecified CKD stage, unspecified acute renal failure type
Hyperkalemia, diminished renal excretion
Hyperkalemia, diminished renal excretion

## 2017-08-09 NOTE — PROGRESS NOTE ADULT - PROBLEM SELECTOR PROBLEM 6
Glaucoma of both eyes, unspecified glaucoma type
Hyperlipidemia, unspecified hyperlipidemia type

## 2017-08-09 NOTE — PROGRESS NOTE ADULT - PROBLEM SELECTOR PROBLEM 5
Benign prostatic hyperplasia, presence of lower urinary tract symptoms unspecified
Glaucoma of both eyes, unspecified glaucoma type

## 2017-08-09 NOTE — PROGRESS NOTE ADULT - ASSESSMENT
71 y/o male with advanced CKD with possible acute on chronic Kidney disease, hyperkalemia, DM, HTN, HLD, Gout, Glaucoma, Anemia who was admitted due to CKD and hyperkalemia. Pt refer to feel better than yesterday, he has been afebrile and blood sugar is well controlled. Pt was evaluated by nephrology and he recommend ACE as part of his tx.    8/9/2017  Patient has no hydronephrosis on renal sono;   Diabetic nephropathy is suspected cause of kidney damage;  Patient has elevated total protein in urine, elevated protein Calc quantification;    Patient started on ACEi- lisinopril 5 mg po q day;  Anemia likely 2/2 CKD TS 18% cont 5 days venofer and EPO Q wk; Nephro recc d/c iv fluids;     Pending vit D and PTH will start po bicarb for Met acidosis; dietician recommended glucerna 1 can daily for inc. nutrient intake;

## 2017-08-09 NOTE — DISCHARGE NOTE ADULT - HOSPITAL COURSE
73 y/o male with advanced CKD with possible acute on chronic Kidney disease, hyperkalemia, DM, HTN, HLD, Gout, Glaucoma, Anemia who was admitted due to CKD and hyperkalemia. Pt refer to feel better than yesterday, he has been afebrile and blood sugar is well controlled. Pt was evaluated by nephrology and he recommend ACE as part of his tx.    8/9/2017  Patient has no hydronephrosis on renal sono; Diabetic nephropathy is suspected cause of kidney damage;  Patient has elevated total protein in urine, elevated protein Calc quantification;    Patient started on ACEi- lisinopril 5 mg po q day;  Anemia likely 2/2 CKD  cont 5 days venofer and EPO Q wk; Nephro recc d/c iv fluids;     Pending vit D and PTH will start po bicarb for Met acidosis; dietician recommended glucerna 1 can daily for inc. nutrient intake; 52 M w/ PMH of CKD (not on HD), DM, HTN, HLD & anemia presents w/ B/L LE edema admitted for hyperkalemia (5.5) & ERYN (BUN/Cr 72/3.75; GFR 15). No EKG changes. Renal US negative for obstruction. Hyperkalemia resolved s/p Kayexalate. Patient's renal function returned to baseline. Patient was deemed not a candidate for hemodialysis and will follow up with primary care physician and nephrology.

## 2017-08-09 NOTE — PROGRESS NOTE ADULT - PROBLEM SELECTOR PROBLEM 3
Type 2 diabetes mellitus with stage 5 chronic kidney disease not on chronic dialysis, with long-term current use of insulin
Anemia in stage 5 chronic kidney disease, not on chronic dialysis
Type 2 diabetes mellitus with stage 5 chronic kidney disease not on chronic dialysis, with long-term current use of insulin
Type 2 diabetes mellitus with stage 5 chronic kidney disease not on chronic dialysis, with long-term current use of insulin

## 2017-08-09 NOTE — PROGRESS NOTE ADULT - PROBLEM SELECTOR PLAN 3
-Levemir 20 units at bed time  -Humalog sliding scale  -diet diabetic   -fingerstick
-A1c=7.3;    -Levemir 20 units at bed time  -Humalog sliding scale  -diet diabetic   -fingerstick
A1c 7.3. Increase levemir, lipids, cont. HISS, FS AC/HS. Diabetic education, Nutrition support as patient diet mainly consists of white rice, bread, rolls, bagels. Will need o/p eye exam, podiatry care.  Insulin detemir 20 units given  , 228
Likely due to advanced renal disease as indices favor AOCD  Iron 325 mg x day

## 2017-08-09 NOTE — PROGRESS NOTE ADULT - PROBLEM SELECTOR PLAN 5
-flomax 0.4 mg one tablet x day
-flomax 0.4 mg one tablet x day
Cont. Flomax, check PVR
timolol drops

## 2017-08-09 NOTE — DISCHARGE NOTE ADULT - MEDICATION SUMMARY - MEDICATIONS TO TAKE
I will START or STAY ON the medications listed below when I get home from the hospital:    lisinopril 5 mg oral tablet  -- 1 tab(s) by mouth once a day  -- Indication: For Hypertension    Flomax 0.4 mg oral capsule  -- 1 cap(s) by mouth once a day  -- Indication: For BPH (benign prostatic hyperplasia)    insulin detemir 100 units/mL subcutaneous solution  -- 15 unit(s) subcutaneous once a day (at bedtime)  -- Check with your doctor before becoming pregnant.  Do not drink alcoholic beverages when taking this medication.  Keep in refrigerator.  Do not freeze.    -- Indication: For Diabetes    insulin glargine  -- 4 unit(s) subcutaneous once a day (in the morning)  -- Indication: For Diabetes    allopurinol 100 mg oral tablet  -- 1 tab(s) by mouth once a day  -- Indication: For Gout    atorvastatin 20 mg oral tablet  -- 1 tab(s) by mouth once a day  -- Indication: For Hyperlipemia    labetalol 100 mg oral tablet  -- 1 tab(s) by mouth 2 times a day  -- Indication: For Hypertension    amLODIPine 10 mg oral tablet  -- 1 tab(s) by mouth once a day  -- Indication: For Hypertension    ferrous sulfate 325 mg (65 mg elemental iron) oral tablet  -- 1 tab(s) by mouth 3 times a day  -- Indication: For Anemia    timolol maleate 0.5% ophthalmic solution  -- 1 drop(s) to each affected eye 2 times a day  -- Indication: For Glaucoma of both eyes, unspecified glaucoma type    Vitamin C 500 mg oral tablet  -- 1 tab(s) by mouth once a day  -- Indication: For supplement    Vitamin B-12 1000 mcg oral tablet  -- 1 tab(s) by mouth once a day  -- Indication: For supplment    ascorbic acid 500 mg oral tablet  -- 1 tab(s) by mouth once a day  -- Indication: For supplement

## 2017-08-09 NOTE — DISCHARGE NOTE ADULT - PATIENT PORTAL LINK FT
“You can access the [facility] Patient Portal, offered by [facility], by registering with the following website: [email or web address]”

## 2017-08-09 NOTE — PROGRESS NOTE ADULT - PROBLEM SELECTOR PROBLEM 4
Anemia in stage 5 chronic kidney disease, not on chronic dialysis
Benign prostatic hyperplasia, presence of lower urinary tract symptoms unspecified

## 2017-10-31 ENCOUNTER — EMERGENCY (EMERGENCY)
Facility: HOSPITAL | Age: 72
LOS: 1 days | Discharge: HOME | End: 2017-10-31
Attending: EMERGENCY MEDICINE
Payer: MEDICAID

## 2017-10-31 VITALS
RESPIRATION RATE: 18 BRPM | DIASTOLIC BLOOD PRESSURE: 80 MMHG | SYSTOLIC BLOOD PRESSURE: 187 MMHG | OXYGEN SATURATION: 99 % | HEART RATE: 74 BPM | TEMPERATURE: 97 F

## 2017-10-31 VITALS — HEIGHT: 66 IN | WEIGHT: 164.91 LBS

## 2017-10-31 LAB
ALBUMIN SERPL ELPH-MCNC: 4 G/DL — SIGNIFICANT CHANGE UP (ref 3.3–5.2)
ALP SERPL-CCNC: 105 U/L — SIGNIFICANT CHANGE UP (ref 40–120)
ALT FLD-CCNC: 17 U/L — SIGNIFICANT CHANGE UP
ANION GAP SERPL CALC-SCNC: 13 MMOL/L — SIGNIFICANT CHANGE UP (ref 5–17)
AST SERPL-CCNC: 17 U/L — SIGNIFICANT CHANGE UP
BASOPHILS # BLD AUTO: 0 K/UL — SIGNIFICANT CHANGE UP (ref 0–0.2)
BASOPHILS NFR BLD AUTO: 0.6 % — SIGNIFICANT CHANGE UP (ref 0–2)
BILIRUB SERPL-MCNC: 0.2 MG/DL — LOW (ref 0.4–2)
BUN SERPL-MCNC: 74 MG/DL — HIGH (ref 8–20)
CALCIUM SERPL-MCNC: 8.7 MG/DL — SIGNIFICANT CHANGE UP (ref 8.6–10.2)
CHLORIDE SERPL-SCNC: 106 MMOL/L — SIGNIFICANT CHANGE UP (ref 98–107)
CO2 SERPL-SCNC: 18 MMOL/L — LOW (ref 22–29)
CREAT SERPL-MCNC: 3.19 MG/DL — HIGH (ref 0.5–1.3)
EOSINOPHIL # BLD AUTO: 0.1 K/UL — SIGNIFICANT CHANGE UP (ref 0–0.5)
EOSINOPHIL NFR BLD AUTO: 1.9 % — SIGNIFICANT CHANGE UP (ref 0–6)
GLUCOSE SERPL-MCNC: 292 MG/DL — HIGH (ref 70–115)
HCT VFR BLD CALC: 27.5 % — LOW (ref 42–52)
HGB BLD-MCNC: 8.9 G/DL — LOW (ref 14–18)
LYMPHOCYTES # BLD AUTO: 1.8 K/UL — SIGNIFICANT CHANGE UP (ref 1–4.8)
LYMPHOCYTES # BLD AUTO: 26.3 % — SIGNIFICANT CHANGE UP (ref 20–55)
MCHC RBC-ENTMCNC: 25.9 PG — LOW (ref 27–31)
MCHC RBC-ENTMCNC: 32.4 G/DL — SIGNIFICANT CHANGE UP (ref 32–36)
MCV RBC AUTO: 80.2 FL — SIGNIFICANT CHANGE UP (ref 80–94)
MONOCYTES # BLD AUTO: 0.5 K/UL — SIGNIFICANT CHANGE UP (ref 0–0.8)
MONOCYTES NFR BLD AUTO: 7.1 % — SIGNIFICANT CHANGE UP (ref 3–10)
NEUTROPHILS # BLD AUTO: 4.3 K/UL — SIGNIFICANT CHANGE UP (ref 1.8–8)
NEUTROPHILS NFR BLD AUTO: 64 % — SIGNIFICANT CHANGE UP (ref 37–73)
PLATELET # BLD AUTO: 159 K/UL — SIGNIFICANT CHANGE UP (ref 150–400)
POTASSIUM SERPL-MCNC: 5.6 MMOL/L — HIGH (ref 3.5–5.3)
POTASSIUM SERPL-SCNC: 5.6 MMOL/L — HIGH (ref 3.5–5.3)
PROT SERPL-MCNC: 7.6 G/DL — SIGNIFICANT CHANGE UP (ref 6.6–8.7)
RBC # BLD: 3.43 M/UL — LOW (ref 4.6–6.2)
RBC # FLD: 14.3 % — SIGNIFICANT CHANGE UP (ref 11–15.6)
SODIUM SERPL-SCNC: 137 MMOL/L — SIGNIFICANT CHANGE UP (ref 135–145)
WBC # BLD: 6.7 K/UL — SIGNIFICANT CHANGE UP (ref 4.8–10.8)
WBC # FLD AUTO: 6.7 K/UL — SIGNIFICANT CHANGE UP (ref 4.8–10.8)

## 2017-10-31 PROCEDURE — 99283 EMERGENCY DEPT VISIT LOW MDM: CPT | Mod: 25

## 2017-10-31 PROCEDURE — 85027 COMPLETE CBC AUTOMATED: CPT

## 2017-10-31 PROCEDURE — 99283 EMERGENCY DEPT VISIT LOW MDM: CPT

## 2017-10-31 PROCEDURE — 36415 COLL VENOUS BLD VENIPUNCTURE: CPT

## 2017-10-31 PROCEDURE — 93005 ELECTROCARDIOGRAM TRACING: CPT

## 2017-10-31 PROCEDURE — 93010 ELECTROCARDIOGRAM REPORT: CPT

## 2017-10-31 PROCEDURE — 80053 COMPREHEN METABOLIC PANEL: CPT

## 2017-10-31 RX ORDER — SODIUM POLYSTYRENE SULFONATE 4.1 MEQ/G
30 POWDER, FOR SUSPENSION ORAL ONCE
Qty: 0 | Refills: 0 | Status: COMPLETED | OUTPATIENT
Start: 2017-10-31 | End: 2017-10-31

## 2017-10-31 RX ADMIN — SODIUM POLYSTYRENE SULFONATE 30 GRAM(S): 4.1 POWDER, FOR SUSPENSION ORAL at 22:23

## 2017-10-31 NOTE — ED STATDOCS - OBJECTIVE STATEMENT
72 year old male, with hx of renal disease, with family at bedside presenting to the ED for abnormal lab result. Pt's family states that the pt had pre-surgical labs for upcoming eye surgery on 11/6/17 and was found to have a potassium level of 6.2. His family states that he was sent to the ED for his abnormal results. No further complaints at this time.

## 2017-10-31 NOTE — ED STATDOCS - PROGRESS NOTE DETAILS
Pt discussed at length with attending HPI/ROS/PE confirmed/ {t seen resting comfortable in no acute distress in chair. Pt informed of all results, pt seen resting comfortably in no acute distress, no acute complaints at this time, pt tolerating PO intake. normal ECG, no cramps, no changes in UA, no difficulty breathing. PE: Reflexes normal, d/c home followup with PMD. Pt educated about when to return to the ED if needed. Pt verbalizes that he understands all instructions and results.   Documented by Liliana Garcia medical scribe, for ALEX Schilling, on 10/31/2017.

## 2017-10-31 NOTE — ED STATDOCS - CONDUCTED A DETAILED DISCUSSION WITH PATIENT AND/OR GUARDIAN REGARDING, MDM
lab results/return to ED if symptoms worsen, persist or questions arise/ekg/need for outpatient follow-up

## 2017-10-31 NOTE — ED ADULT TRIAGE NOTE - CHIEF COMPLAINT QUOTE
sent in for high potassium, had labs today preop for eye surgery. has kidney disease history, not yet on dialyses.

## 2017-10-31 NOTE — ED STATDOCS - ATTENDING CONTRIBUTION TO CARE
I, Dami Verma, performed the initial face to face bedside interview with this patient regarding history of present illness, review of symptoms and relevant past medical, social and family history.  I completed an independent physical examination.  I was the initial provider who evaluated this patient. I have signed out the follow up of any pending tests (i.e. labs, radiological studies) to the ACP.  I have communicated the patient’s plan of care and disposition with the ACP.  The history, relevant review of systems, past medical and surgical history, medical decision making, and physical examination was documented by the scribe in my presence and I attest to the accuracy of the documentation.

## 2017-11-01 PROBLEM — R94.31 ABNORMAL ELECTROCARDIOGRAM [ECG] [EKG]: Chronic | Status: ACTIVE | Noted: 2017-08-05

## 2017-11-01 PROBLEM — M10.9 GOUT, UNSPECIFIED: Chronic | Status: ACTIVE | Noted: 2017-08-05

## 2017-11-01 PROBLEM — N40.0 BENIGN PROSTATIC HYPERPLASIA WITHOUT LOWER URINARY TRACT SYMPTOMS: Chronic | Status: ACTIVE | Noted: 2017-08-05

## 2017-11-01 PROBLEM — R00.1 BRADYCARDIA, UNSPECIFIED: Chronic | Status: ACTIVE | Noted: 2017-08-05

## 2017-11-01 PROBLEM — E11.9 TYPE 2 DIABETES MELLITUS WITHOUT COMPLICATIONS: Chronic | Status: ACTIVE | Noted: 2017-08-05

## 2017-11-01 PROBLEM — E78.5 HYPERLIPIDEMIA, UNSPECIFIED: Chronic | Status: ACTIVE | Noted: 2017-08-05

## 2017-11-01 PROBLEM — D64.9 ANEMIA, UNSPECIFIED: Chronic | Status: ACTIVE | Noted: 2017-08-05

## 2017-11-01 PROBLEM — I10 ESSENTIAL (PRIMARY) HYPERTENSION: Chronic | Status: ACTIVE | Noted: 2017-08-05

## 2017-11-01 PROBLEM — N18.9 CHRONIC KIDNEY DISEASE, UNSPECIFIED: Chronic | Status: ACTIVE | Noted: 2017-08-05

## 2020-11-13 NOTE — ED PROVIDER NOTE - CADM POA PRESS ULCER
Continue current medications  Please wash right foot ulcer daily. Cover with antibiotic ointment and bandaid until healed.  Use post op shoe to avoid pressure over toe  Continue diet, exercise efforts  Follow up 6 months for recheck with fasting lab prior
No
